# Patient Record
Sex: FEMALE | Race: WHITE | NOT HISPANIC OR LATINO | Employment: UNEMPLOYED | URBAN - NONMETROPOLITAN AREA
[De-identification: names, ages, dates, MRNs, and addresses within clinical notes are randomized per-mention and may not be internally consistent; named-entity substitution may affect disease eponyms.]

---

## 2017-01-04 ENCOUNTER — OFFICE VISIT (OUTPATIENT)
Dept: OTOLARYNGOLOGY | Facility: CLINIC | Age: 37
End: 2017-01-04

## 2017-01-04 VITALS
HEIGHT: 65 IN | HEART RATE: 68 BPM | DIASTOLIC BLOOD PRESSURE: 78 MMHG | TEMPERATURE: 98.8 F | BODY MASS INDEX: 48.82 KG/M2 | WEIGHT: 293 LBS | SYSTOLIC BLOOD PRESSURE: 130 MMHG

## 2017-01-04 DIAGNOSIS — K21.9 GASTROESOPHAGEAL REFLUX DISEASE WITHOUT ESOPHAGITIS: ICD-10-CM

## 2017-01-04 DIAGNOSIS — J31.0 CHRONIC RHINITIS: ICD-10-CM

## 2017-01-04 DIAGNOSIS — R09.89 GLOBUS SENSATION: Primary | ICD-10-CM

## 2017-01-04 DIAGNOSIS — J30.1 SEASONAL ALLERGIC RHINITIS DUE TO POLLEN: ICD-10-CM

## 2017-01-04 PROBLEM — R09.A2 GLOBUS SENSATION: Status: ACTIVE | Noted: 2017-01-04

## 2017-01-04 PROCEDURE — 1036F TOBACCO NON-USER: CPT | Performed by: OTOLARYNGOLOGY

## 2017-01-04 PROCEDURE — 99214 OFFICE O/P EST MOD 30 MIN: CPT | Performed by: OTOLARYNGOLOGY

## 2017-01-04 RX ORDER — OMEPRAZOLE 40 MG/1
40 CAPSULE, DELAYED RELEASE ORAL DAILY
Qty: 30 CAPSULE | Refills: 3 | Status: SHIPPED | OUTPATIENT
Start: 2017-01-04 | End: 2017-02-03

## 2017-01-04 RX ORDER — BUPROPION HYDROCHLORIDE 150 MG/1
TABLET, EXTENDED RELEASE ORAL DAILY
Refills: 3 | COMMUNITY
Start: 2016-12-29 | End: 2021-05-03

## 2017-01-04 RX ORDER — LEVOTHYROXINE SODIUM 0.07 MG/1
TABLET ORAL
COMMUNITY
Start: 2016-12-31

## 2017-01-04 NOTE — PROGRESS NOTES
PRIMARY CARE PROVIDER: Saad Horne MD  REFERRING PROVIDER: No ref. provider found    Chief Complaint   Patient presents with   • Sinus Problem     Cough/Snoring/Tickle in Throat   • Sinus Problem     Symptoms return after Turbinate Reduction       Subjective   History of Present Illness:  Pallavi Singh is a  36 y.o.  female who complains of nasal congestion and allergy, a globus sensation. The symptoms are not localized to a particular location. The patient has had moderate and nagging symptoms. The symptoms have been intermittant for the last several months . The symptoms are aggravated by  allergy and weather change . The symptoms are improved by no identifieable factors. She states she wakes up and feels like she has something in her throat and often has thick mucous to spit out. She states she drinks a large amount of caffeine.  She has had snoring but reports she does not tend to wake herself up at night.  She reports others that have observed her sleeping reported that there is no apnea.  She does not feel well rested during the day.  She has had previous allergy testing in the past that was positive.  She reports that initially a turbinoplasty helped her symptoms for several years but it has worsened over the last several months.  She has not using any specific allergy medications.    Review of Systems:  Review of Systems   Constitutional: Negative for chills and fever.   HENT:        See HPI   Eyes: Negative.    Respiratory: Positive for cough. Negative for shortness of breath.    Gastrointestinal: Negative for nausea and vomiting.   Allergic/Immunologic: Negative.    Neurological: Negative for dizziness and headaches.   Psychiatric/Behavioral: Negative.        Past History:  Past Medical History   Diagnosis Date   • Allergic rhinitis    • Chronic laryngitis    • Chronic rhinitis    • Chronic sinusitis    • Deviated nasal septum    • Globus sensation    • Hypothyroidism      Past Surgical History    Procedure Laterality Date   • Tonsillectomy and adenoidectomy     • Turbinoplasty  05/11/2015     Family History   Problem Relation Age of Onset   • Cancer Other    • Diabetes Other    • Hypertension Other      Social History   Substance Use Topics   • Smoking status: Never Smoker   • Smokeless tobacco: Never Used   • Alcohol use Yes      Comment: rare       Current Outpatient Prescriptions:   •  buPROPion SR (WELLBUTRIN SR) 150 MG 12 hr tablet, , Disp: , Rfl: 3  •  levothyroxine (SYNTHROID, LEVOTHROID) 75 MCG tablet, , Disp: , Rfl:   •  omeprazole (priLOSEC) 40 MG capsule, Take 1 capsule by mouth Daily for 30 days. Take 40 mg by mouth 30 minute-1 hour before the last large meal you would eat before going to bed, Disp: 30 capsule, Rfl: 3  Allergies:  Bactrim [sulfamethoxazole-trimethoprim]    Objective     Vital Signs:  Temp:  [98.8 °F (37.1 °C)] 98.8 °F (37.1 °C)  Heart Rate:  [68] 68  BP: (130)/(78) 130/78    Physical Exam:  CONSTITUTIONAL: well nourished, well-developed, alert, oriented, in no acute distress   COMMUNICATION AND VOICE: able to communicate normally, normal voice quality  HEAD: normocephalic, no lesions, atraumatic, no tenderness, no masses   FACE: appearance normal, no lesions, no tenderness, no deformities, facial motion symmetric  SALIVARY GLANDS: parotid glands with no tenderness, no swelling, no masses, submandibular glands with normal size, nontender  EYES: ocular motility normal, eyelids normal, orbits normal, no proptosis, conjunctiva normal , pupils equal, round   EARS:  Hearing: response to conversational voice normal bilaterally   External Ears: auricles without lesions  Otoscopic: tympanic membrane appearance normal, no lesions, no perforation, normal mobility, no fluid  NOSE:  External Nose: structure normal, no tenderness on palpation, no nasal discharge, no lesions, no evidence of trauma, nostrils patent   Intranasal Exam: nasal mucosa edema and erythema, vestibule within normal  limits, inferior turbinate mild bilateral hypertrophy, nasal septum relatively midline   Nasopharynx: nasopharyngeal mucosa within normal limits  ORAL:  Lips: upper and lower lips without lesion   Teeth: dentition within normal limits for age   Gums: gingivae healthy   Oral Mucosa: oral mucosa normal, no mucosal lesions   Floor of Mouth: Warthin’s duct patent, mucosa normal  Tongue: lingual mucosa normal without lesions, normal tongue mobility   Palate: soft and hard palates with normal mucosa and structure  Oropharynx: oropharyngeal mucosa dry, s/p tonsillectomy  HYPOPHARYNX: hypopharyngeal mucosa normal  LARYNX: epiglottis is omega shaped and prevents visualization of the glottis.  There is no mass or lesion seen there is generalized dryness   NECK: neck appearance normal, no masses or tenderness  THYROID: no overt thyromegaly, no tenderness, nodules or mass present on palpation, position midline   LYMPH NODES: no lymphadenopathy  CHEST/RESPIRATORY: respiratory effort normal, normal breath sounds   CARDIOVASCULAR: rate and rhythm normal, extremities without cyanosis or edema    NEUROLOGIC/PSYCHIATRIC: oriented to time, place and person, mood normal, affect appropriate, CN II-XII intact grossly    Results Review:   None    Assessment   1. Globus sensation    2. Seasonal allergic rhinitis due to pollen    3. Gastroesophageal reflux disease without esophagitis    4. Chronic rhinitis        Plan   Continue current management plan. Decrease caffeine intake and increase water intake. Patient states she is not interested in nasal sprays because they irritate her nose. We discussed use of an OTC allergy medication such as Zyrtec, Claritin or Allegra. She can also use Mucinex to help thin secretions.      -------MEDICATIONS:-------  New Medications Ordered This Visit   Medications   • omeprazole (priLOSEC) 40 MG capsule     Sig: Take 1 capsule by mouth Daily for 30 days. Take 40 mg by mouth 30 minute-1 hour before the last  large meal you would eat before going to bed     Dispense:  30 capsule     Refill:  3      Sleep with the head of bed on an incline. No large meals 1-2 hrs prior to sleep. The patient was instructed to use PPI's 30 minutes prior to the last meal of the day. The patient was advised to avoid fatty meals and caffine or alcohol prior to sleep. Increase water/ non caffeinated drink intake to at least 6-8 glasses of  a day. Decrease caffeine intake. Plain Mucinex over the counter as needed to thin out secretions.    --------TESTING:--------  Intradermal Allergy Testing - hold antihistamines for 1 week prior to the testing      -----FOLLOW UP-----  Return for Allergy testing results.        Yair Bagley MD  01/04/17  3:28 PM

## 2017-01-04 NOTE — MR AVS SNAPSHOT
Pallavi Singh   1/4/2017 1:45 PM   Office Visit    Dept Phone:  [phone]   Encounter #:  [id number]    Provider:  Yair Bagley MD   Department:  [facility]                Your Full Care Plan              Today's Medication Changes          These changes are accurate as of: 1/4/17  3:19 PM.  If you have any questions, ask your nurse or doctor.               New Medication(s)Ordered:     omeprazole 40 MG capsule   Commonly known as:  priLOSEC   Take 1 capsule by mouth Daily for 30 days. Take 40 mg by mouth 30 minute-1 hour before the last large meal you would eat before going to bed   Started by:  Yair Bagley MD            Where to Get Your Medications      These medications were sent to [facility] [address], [place] - [address] AT [facility] [address] - [phone]  - [phone] FX  [address], [place] [address]    Hours:  24-hours Phone:  [phone]     omeprazole 40 MG capsule                  Your Updated Medication List          This list is accurate as of: 1/4/17  3:19 PM.  Always use your most recent med list.                buPROPion  MG 12 hr tablet   Commonly known as:  WELLBUTRIN SR       levothyroxine 75 MCG tablet   Commonly known as:  SYNTHROID, LEVOTHROID       omeprazole 40 MG capsule   Commonly known as:  priLOSEC   Take 1 capsule by mouth Daily for 30 days. Take 40 mg by mouth 30 minute-1 hour before the last large meal you would eat before going to bed               We Performed the Following     Intradermal Allergy Testing       You Were Diagnosed With        Codes Comments    Globus sensation    -  Primary ICD-10-CM: F45.8  ICD-9-CM: 306.4     Seasonal allergic rhinitis due to pollen     ICD-10-CM: J30.1  ICD-9-CM: 477.0     Gastroesophageal reflux disease without esophagitis     ICD-10-CM: K21.9  ICD-9-CM: 530.81     Chronic rhinitis     ICD-10-CM: J31.0  ICD-9-CM: 472.0        "  Instructions     None    Patient Instructions History      Upcoming Appointments     Visit Type Date Time Department    [facility]  1:45 PM [facility]    TEST 2017  3:00 PM [facility] [place]      MyChart Signup     Our records indicate that you have declined [facility] signup. If you would like to sign up for MyChart, please email [email or web address] or call [phone] to obtain an activation code.             Other Info from Your Visit           Your Appointments     2017  3:00 PM CST   TEST with ALLERGY TECH ENT [facility] (--)    [address]   [address]  [facility] [address] [phone]              Allergies     Bactrim [Sulfamethoxazole-trimethoprim]  Rash      Reason for Visit     Sinus Problem Cough/Snoring/Tickle in Throat    Sinus Problem Symptoms return after Turbinate Reduction      Vital Signs     Blood Pressure Pulse Temperature Height Weight Body Mass Index    130/78 68 98.8 °F (37.1 °C) 65\" (165.1 cm) 306 lb (139 kg) 50.92 kg/m2    Smoking Status                   Never Smoker           Problems and Diagnoses Noted     Chronic inflammation of the nose    Acid reflux disease    Sensation of lump in throat    Seasonal allergic rhinitis due to pollen        "

## 2017-01-10 ENCOUNTER — PROCEDURE VISIT (OUTPATIENT)
Dept: OTOLARYNGOLOGY | Facility: CLINIC | Age: 37
End: 2017-01-10

## 2017-01-10 DIAGNOSIS — J30.89 OTHER ALLERGIC RHINITIS: Primary | ICD-10-CM

## 2017-01-10 PROCEDURE — 95004 PERQ TESTS W/ALRGNC XTRCS: CPT | Performed by: NURSE PRACTITIONER

## 2017-01-10 PROCEDURE — 95024 IQ TESTS W/ALLERGENIC XTRCS: CPT | Performed by: NURSE PRACTITIONER

## 2017-01-10 NOTE — PROGRESS NOTES
Allergy History Questionnaire  Pallavi Singh 1980 1/10/2017  Occupation: Director of [employer]    Symptoms  (Check which applies)  Severity? Frequency? When are they worse?   [] Mild [x] Constant [] Spring   [] Moderate [] Erratic [] Summer   [] Severe [] Occasional [] Fall   [x] Variable [] Seasonal [] Winter     [] Year Round       Do they interfere with your life? Do they interfere with your sleep?   [] Note at all [x] Yes   [] A little [] No   [] Moderately [] If yes, please explain: _____coughing______________________________   [x] Prevents normal activity        Please check the symptoms that apply to your allergy symptoms.  Nose Eyes Ears   [x] Bleeding [] Infections [x] Buzzing   [x] Crusting [x] Itching [x] Dizziness   [x] Dryness [x] Redness [] Drainage   [] Itching [x] Swollen Lids [x] Fullness   [x] Nasal Congestion [] Watery [x] Hearing Loss   [x] Nasal Drainage [] None [x] Itching   [] Nasal Polyps  [] Pain   [] Septal Deviation  [x] Pressure   [x] Sneezing     [] None         Throat Mouth Chest   [] Burning [] Burning [x] Asthma as a child   [x] Dryness [x] Dryness [x] Asthma as an adult   [x] Hoarseness [] Itching [x] Bronchitis   [x] Laryngitis [x] Mouth Breathing [x] Cough   [x] Snoring [] None [x] Pneumonia   [x] Sore Throats  [] Wheezing   [x] Tonsillectomy  [] None   [] Vocal Cord Polyps     [] None           Skin   [x] Dryness   [] Eczema   [x] Hives   [x] Itching   [] Rash   [] Swelling   [] None     Other Irritants: none    Environment    Inside Home? Smoking Habits?   [x] Carpeting [] Cigarettes   [] Hardwood [] Cigars   [] Fireplace (Gas or Wood Burning) [] Pipe   [x] Laminate Floor [] Years Smoked   [] Plants [] Stopped Smoking    [] Tile [x] Exposed to Secondhand Smoke     Animals in the home? Near your home?   [] Bird(s) [] Barn   [x] Cat(s) x 2 [] Factory   [] Dog(s) [x] Fields   [] Fish [x] Trees   [] Other [x] Weeds    [x] Water (creek, lake, pond, river)     Heating your  home? Cooling your home?   [x] Electric [x] Central Air Unit   [] Natural Gas [] Fan(s)   [] Oil [] Window Unit   [] Propane    [] Wood    [] Luciano/Thermal      Any known allergic reactions to any of the following?   [x] Bees   [x] Mosquitoes   [x] Wasps                 Have you ever been allergy tested in the past?  Yes as a young child.    Have you ever had to seek medical treatment in an Emergency Room due to an allergic reaction?  No

## 2017-01-10 NOTE — MR AVS SNAPSHOT
Pallavi Singh   1/10/2017 3:00 PM   Procedure visit    Dept Phone:  [phone]   Encounter #:  [id number]    Provider:  ALLERGY TECH ENT [place]   Department:  [facility]                Your Full Care Plan              Your Updated Medication List          This list is accurate as of: 1/10/17  4:40 PM.  Always use your most recent med list.                buPROPion  MG 12 hr tablet   Commonly known as:  WELLBUTRIN SR       levothyroxine 75 MCG tablet   Commonly known as:  SYNTHROID, LEVOTHROID       omeprazole 40 MG capsule   Commonly known as:  priLOSEC   Take 1 capsule by mouth Daily for 30 days. Take 40 mg by mouth 30 minute-1 hour before the last large meal you would eat before going to bed               We Performed the Following     Intradermal Allergy Testing     Prick Testing (multi-Test)       You Were Diagnosed With        Codes Comments    Other allergic rhinitis    -  Primary ICD-10-CM: J30.89  ICD-9-CM: 477.8       Instructions     None    Patient Instructions History      Upcoming Appointments     Visit Type Date Time Department    TEST 1/10/2017  3:00 PM [facility]    FOLLOW UP 2017 12:45 PM [facility]      MyChart Signup     Our records indicate that you have declined [facility] signup. If you would like to sign up for [employer], please email [email or web address] or call [phone] to obtain an activation code.             Other Info from Your Visit           Your Appointments     2017 12:45 PM CST   Follow Up with Yair Bagley MD   [facility] (--)    [address] [place] [address] [address] [facility] [address] [place] [id number] [phone]           Arrive 15 minutes prior to appointment.              Allergies     Bactrim [Sulfamethoxazole-trimethoprim]  Rash      Reason for Visit     Allergy Testing           Vital Signs     Smoking Status                   Never Smoker           Problems and  Diagnoses Noted     Other allergic rhinitis    -  Primary

## 2017-02-06 ENCOUNTER — OFFICE VISIT (OUTPATIENT)
Dept: OTOLARYNGOLOGY | Facility: CLINIC | Age: 37
End: 2017-02-06

## 2017-02-06 VITALS
TEMPERATURE: 98 F | SYSTOLIC BLOOD PRESSURE: 121 MMHG | DIASTOLIC BLOOD PRESSURE: 88 MMHG | HEIGHT: 65 IN | BODY MASS INDEX: 48.82 KG/M2 | HEART RATE: 93 BPM | WEIGHT: 293 LBS

## 2017-02-06 DIAGNOSIS — J30.1 SEASONAL ALLERGIC RHINITIS DUE TO POLLEN: Primary | ICD-10-CM

## 2017-02-06 DIAGNOSIS — J31.0 CHRONIC RHINITIS: ICD-10-CM

## 2017-02-06 PROCEDURE — 99213 OFFICE O/P EST LOW 20 MIN: CPT | Performed by: OTOLARYNGOLOGY

## 2017-02-06 RX ORDER — CETIRIZINE HYDROCHLORIDE 10 MG/1
10 TABLET ORAL DAILY
Qty: 30 TABLET | Refills: 3 | Status: SHIPPED | OUTPATIENT
Start: 2017-02-06 | End: 2017-03-08

## 2017-02-06 RX ORDER — MONTELUKAST SODIUM 10 MG/1
10 TABLET ORAL DAILY
Qty: 30 TABLET | Refills: 3 | Status: SHIPPED | OUTPATIENT
Start: 2017-02-06 | End: 2017-03-08

## 2017-02-06 RX ORDER — FLUOXETINE 20 MG/1
TABLET, FILM COATED ORAL
COMMUNITY
Start: 2017-02-03 | End: 2021-05-03

## 2017-02-06 RX ORDER — FLUTICASONE PROPIONATE 44 UG/1
2 AEROSOL, METERED RESPIRATORY (INHALATION) DAILY
Qty: 10.6 G | Refills: 11 | Status: SHIPPED | OUTPATIENT
Start: 2017-02-06 | End: 2021-05-03

## 2017-02-06 NOTE — PROGRESS NOTES
PRIMARY CARE PROVIDER: Saad Horne MD  REFERRING PROVIDER: No ref. provider found    Chief Complaint   Patient presents with   • Follow-up     Allergy Testing Results       Subjective   History of Present Illness:  Pallavi Singh is a  36 y.o.  female who is here for follow up allergy testing. She has had continued problems with nasal drainage, nasal congestion and allergy. The symptoms are not localized to a particular location. She has had moderate symptoms. The symptoms have been relatively constant for the last several years . The symptoms are aggravated by  allergy and weather change . The symptoms are improved by no identifieable factors.  She states she is currently not on an antihistamine or nasal spray; she has not had success with nasal sprays in the past but is willing to try an oral antihistamine.     Review of Systems:  Review of Systems   Constitutional: Negative for chills and fever.   HENT:        See HPI   Eyes: Negative.    Respiratory: Negative for cough and shortness of breath.    Cardiovascular: Negative.    Gastrointestinal: Negative for nausea and vomiting.   Neurological: Negative for dizziness and headaches.   Hematological: Does not bruise/bleed easily.   Psychiatric/Behavioral: Negative.        Past History:  Past Medical History   Diagnosis Date   • Allergic rhinitis    • Chronic laryngitis    • Chronic rhinitis    • Chronic sinusitis    • Deviated nasal septum    • Globus sensation    • Hypothyroidism      Past Surgical History   Procedure Laterality Date   • Tonsillectomy and adenoidectomy     • Turbinoplasty  05/11/2015     Family History   Problem Relation Age of Onset   • Cancer Other    • Diabetes Other    • Hypertension Other      Social History   Substance Use Topics   • Smoking status: Never Smoker   • Smokeless tobacco: Never Used   • Alcohol use Yes      Comment: rare       Current Outpatient Prescriptions:   •  buPROPion SR (WELLBUTRIN SR) 150 MG 12 hr tablet, Daily.,  Disp: , Rfl: 3  •  FLUoxetine (PROzac) 20 MG tablet, , Disp: , Rfl:   •  levothyroxine (SYNTHROID, LEVOTHROID) 75 MCG tablet, , Disp: , Rfl:   •  cetirizine (zyrTEC) 10 MG tablet, Take 1 tablet by mouth Daily for 30 days. 1 by mouth every day as needed for allergy symptoms, Disp: 30 tablet, Rfl: 3  •  fluticasone (FLOVENT HFA) 44 MCG/ACT inhaler, Inhale 2 puffs Daily., Disp: 10.6 g, Rfl: 11  •  montelukast (SINGULAIR) 10 MG tablet, Take 1 tablet by mouth Daily for 30 days., Disp: 30 tablet, Rfl: 3  Allergies:  Bactrim [sulfamethoxazole-trimethoprim]    Objective     Vital Signs:  Temp:  [98 °F (36.7 °C)] 98 °F (36.7 °C)  Heart Rate:  [93] 93  BP: (121)/(88) 121/88    Physical Exam:  CONSTITUTIONAL: well nourished, well-developed, alert, oriented, in no acute distress   COMMUNICATION AND VOICE: able to communicate normally for age, normal voice quality  HEAD: normocephalic, no lesions, atraumatic, no tenderness, no masses   FACE: appearance normal, no lesions, no tenderness, no deformities, facial motion symmetric  EYES: ocular motility normal, eyelids normal, orbits normal, no proptosis, conjunctiva normal , pupils equal, round   EARS:  Hearing: response to conversational voice normal bilaterally   External Ears: auricles without lesions  NOSE:  External Nose: structure normal, no tenderness on palpation, no nasal discharge, no lesions, no evidence of trauma, nostrils patent   Intranasal exam: nasal mucosa with mucosal congestion and erythema, nasal septum relatively midline   ORAL:  Lips: upper and lower lips without lesion   NECK: neck appearance normal  CHEST/RESPIRATORY: respiratory effort normal, normal chest excursion  CARDIOVASCULAR: extremities without cyanosis or edema   NEUROLOGIC/PSYCHIATRIC: oriented appropriately, mood normal, affect appropriate, CN II-XII intact grossly    Results Review:   Allergy testing results reviewed.     Assessment   1. Seasonal allergic rhinitis due to pollen    2. Chronic  rhinitis        Plan   Continue current management plan.      -------MEDICATIONS:-------  New Medications Ordered This Visit   Medications   • cetirizine (zyrTEC) 10 MG tablet     Sig: Take 1 tablet by mouth Daily for 30 days. 1 by mouth every day as needed for allergy symptoms     Dispense:  30 tablet     Refill:  3   • fluticasone (FLOVENT HFA) 44 MCG/ACT inhaler     Sig: Inhale 2 puffs Daily.     Dispense:  10.6 g     Refill:  11   • montelukast (SINGULAIR) 10 MG tablet     Sig: Take 1 tablet by mouth Daily for 30 days.     Dispense:  30 tablet     Refill:  3     Immunotherapy risks and benefits were discussed with the patient/family at length including but not limited to the risk of reaction including anaphylaxis requiring hospitalization and/or death. The possibility of failure of therapy was also discussed as well as the necessity of having an epi pen with them to receive therapy every time.   Sublingual immunotherapy was discussed as an alternative. Benefits of a better safety profile, allowing for safe home use as well as decreased patient costs (gas and time savings as well as no copays etc) were discussed. Billing was discussed as well as most insurance do not cover sublingual therapy requiring out of pocket billling. Though generally safer than injections, the necessity of having an epi pen with them when they placed the drops was stressed. They were also instructed to never be alone when administering the drops. The fact that aqueous sublingual immunotherapy was not FDA approved was also discussed.   After considering the options of immunotherapy, she decided to try medication and consider immunotherapy in the future  GENERAL ALLERGY AVOIDANCE: Regular vacuum cleaning is  recommended  to prevent accumulation of allergens. Use adequate filtration, including double thickness bags or HEPA filters on the  outlet. Use air-conditioning where possible. Change central air filters with an allergy rated  filter on a regular basis. Install car pollen filters where possible.   DUST MITE AVOIDANCE: Use matress and pillow covers to prevent dust mite contamination. Wash bedsheets in hot water at least twice a week to prevent dust mites. Try a dehumidifier to discourage dust mite growth. Consider removing carpets and replaceing with hard wook geovany. Remove things like drapery and upholstery if possible to prevent dust collection. Dust with a mask and a damp duster.   CAT AVOIDANCE: Consider cat removal or preventing the cat in the home (outdoor cat). Try to reduce reservoirs for cat allergens, such as carpets, sofas, drapes and blankets. Washing animals regularly removes large quantities of allergen, but needs to be repeated regularly, perhaps as often as twice weekly. Washing may be only possible with a few cats.   MOLD AVOIDANCE: Avoiding Outdoor Molds: 1) Decrease decaying vegetationand mulch near house 2) Ventilate crawl space 3) Sump pump excess water 4) Drain low lying areas around house 5) Avoid lawnmowing or wear mask during and after mowing 6) Avoid storage areas of grains, berger and decaying vegetation -Avoiding Indoor Molds: 1) Keep humidity below 50% with a dehumidifier 2) Repair leaks- use fungicide 3) Clean up damp areas 4) Use mold retardant in paint, wallpaper glue, shower curtains, grout, clean regularly with a chlorine bleach solution 5) Examine houseplants for molds, especially in the soil 6) Examine basements, crawl spaces for water and damp areas. 7) Avoid carpet in the basement areas.       -----FOLLOW UP-----  Return in about 6 months (around 8/6/2017) for Allergy f/u.        Yair Bagley MD  02/06/17  1:36 PM

## 2017-02-06 NOTE — PATIENT INSTRUCTIONS
GENERAL ALLERGY AVOIDANCE: Regular vacuum cleaning is  recommended  to prevent accumulation of allergens. Use adequate filtration, including double thickness bags or HEPA filters on the  outlet. Use air-conditioning where possible. Change central air filters with an allergy rated filter on a regular basis. Install car pollen filters where possible.   DUST MITE AVOIDANCE: Use matress and pillow covers to prevent dust mite contamination. Wash bedsheets in hot water at least twice a week to prevent dust mites. Try a dehumidifier to discourage dust mite growth. Consider removing carpets and replaceing with hard wook geovany. Remove things like drapery and upholstery if possible to prevent dust collection. Dust with a mask and a damp duster.   CAT AVOIDANCE: Consider cat removal or preventing the cat in the home (outdoor cat). Try to reduce reservoirs for cat allergens, such as carpets, sofas, drapes and blankets. Washing animals regularly removes large quantities of allergen, but needs to be repeated regularly, perhaps as often as twice weekly. Washing may be only possible with a few cats.   MOLD AVOIDANCE: Avoiding Outdoor Molds: 1) Decrease decaying vegetationand mulch near house 2) Ventilate crawl space 3) Sump pump excess water 4) Drain low lying areas around house 5) Avoid lawnmowing or wear mask during and after mowing 6) Avoid storage areas of grains, berger and decaying vegetation -Avoiding Indoor Molds: 1) Keep humidity below 50% with a dehumidifier 2) Repair leaks- use fungicide 3) Clean up damp areas 4) Use mold retardant in paint, wallpaper glue, shower curtains, grout, clean regularly with a chlorine bleach solution 5) Examine houseplants for molds, especially in the soil 6) Examine basements, crawl spaces for water and damp areas. 7) Avoid carpet in the basement areas.

## 2018-02-13 ENCOUNTER — OFFICE VISIT (OUTPATIENT)
Dept: OBGYN | Age: 38
End: 2018-02-13
Payer: COMMERCIAL

## 2018-02-13 VITALS
WEIGHT: 293 LBS | BODY MASS INDEX: 48.82 KG/M2 | HEIGHT: 65 IN | SYSTOLIC BLOOD PRESSURE: 168 MMHG | DIASTOLIC BLOOD PRESSURE: 105 MMHG

## 2018-02-13 DIAGNOSIS — N92.6 IRREGULAR BLEEDING: ICD-10-CM

## 2018-02-13 DIAGNOSIS — R63.5 WEIGHT GAIN: ICD-10-CM

## 2018-02-13 DIAGNOSIS — Z01.419 WELL WOMAN EXAM WITH ROUTINE GYNECOLOGICAL EXAM: Primary | ICD-10-CM

## 2018-02-13 DIAGNOSIS — Z11.3 SCREEN FOR STD (SEXUALLY TRANSMITTED DISEASE): ICD-10-CM

## 2018-02-13 DIAGNOSIS — Z12.4 SCREENING FOR CERVICAL CANCER: ICD-10-CM

## 2018-02-13 PROCEDURE — 99395 PREV VISIT EST AGE 18-39: CPT | Performed by: NURSE PRACTITIONER

## 2018-02-13 RX ORDER — ACETAMINOPHEN AND CODEINE PHOSPHATE 120; 12 MG/5ML; MG/5ML
1 SOLUTION ORAL DAILY
Qty: 28 TABLET | Refills: 11 | Status: SHIPPED | OUTPATIENT
Start: 2018-02-13 | End: 2019-02-16 | Stop reason: SDUPTHER

## 2018-02-13 RX ORDER — LEVOTHYROXINE SODIUM 0.07 MG/1
TABLET ORAL
COMMUNITY
Start: 2016-12-31

## 2018-02-13 ASSESSMENT — ENCOUNTER SYMPTOMS
EYES NEGATIVE: 1
GASTROINTESTINAL NEGATIVE: 1
RESPIRATORY NEGATIVE: 1

## 2018-02-13 NOTE — PROGRESS NOTES
pectoral and no lateral adenopathy present. Left axillary: No pectoral and no lateral adenopathy present. Right: No inguinal, no supraclavicular and no epitrochlear adenopathy present. Left: No inguinal, no supraclavicular and no epitrochlear adenopathy present. Neurological: She is alert and oriented to person, place, and time. She has normal reflexes. Skin: Skin is warm and dry. No rash noted. Psychiatric: She has a normal mood and affect. Nursing note and vitals reviewed. 1. Well woman exam with routine gynecological exam     2. Screening for cervical cancer  PAP SMEAR   3. Screen for STD (sexually transmitted disease)  CANCELED: HIV Screen    CANCELED: Hepatitis Panel, Acute    CANCELED: RPR Reflex to Titer and TPPA   4. Irregular bleeding     5. Weight gain         MEDICATIONS:  Orders Placed This Encounter   Medications    norethindrone (ORTHO MICRONOR) 0.35 MG tablet     Sig: Take 1 tablet by mouth daily     Dispense:  28 tablet     Refill:  11       ORDERS:  Orders Placed This Encounter   Procedures    PAP SMEAR       PLAN:  Pap collected  Discussed birth control options, start Sunday after next period. Patient advised to take same time daily, may have breakthrough bleeding for the first 1-3 months. Advised to use back up contraception for the first month and with use of antibiotics. Birth control is not effective against STD's. Risk vs. Benefits discussed. Patient voiced understanding. Patient Instructions   Patient Education        Well Visit, Ages 25 to 48: Care Instructions  Your Care Instructions    Physical exams can help you stay healthy. Your doctor has checked your overall health and may have suggested ways to take good care of yourself. He or she also may have recommended tests. At home, you can help prevent illness with healthy eating, regular exercise, and other steps. Follow-up care is a key part of your treatment and safety.  Be sure to make and go to all appointments, and call your doctor if you are having problems. It's also a good idea to know your test results and keep a list of the medicines you take. How can you care for yourself at home? · Reach and stay at a healthy weight. This will lower your risk for many problems, such as obesity, diabetes, heart disease, and high blood pressure. · Get at least 30 minutes of physical activity on most days of the week. Walking is a good choice. You also may want to do other activities, such as running, swimming, cycling, or playing tennis or team sports. Discuss any changes in your exercise program with your doctor. · Do not smoke or allow others to smoke around you. If you need help quitting, talk to your doctor about stop-smoking programs and medicines. These can increase your chances of quitting for good. · Talk to your doctor about whether you have any risk factors for sexually transmitted infections (STIs). Having one sex partner (who does not have STIs and does not have sex with anyone else) is a good way to avoid these infections. · Use birth control if you do not want to have children at this time. Talk with your doctor about the choices available and what might be best for you. · Protect your skin from too much sun. When you're outdoors from 10 a.m. to 4 p.m., stay in the shade or cover up with clothing and a hat with a wide brim. Wear sunglasses that block UV rays. Even when it's cloudy, put broad-spectrum sunscreen (SPF 30 or higher) on any exposed skin. · See a dentist one or two times a year for checkups and to have your teeth cleaned. · Wear a seat belt in the car. · Drink alcohol in moderation, if at all. That means no more than 2 drinks a day for men and 1 drink a day for women. Follow your doctor's advice about when to have certain tests. These tests can spot problems early. For everyone  · Cholesterol. Have the fat (cholesterol) in your blood tested after age 21.  Your doctor will tell you how should have this test. Some experts suggest it if you are older than 39 and are -American or have a father or brother who got prostate cancer when he was younger than 72. When should you call for help? Watch closely for changes in your health, and be sure to contact your doctor if you have any problems or symptoms that concern you. Where can you learn more? Go to https://Liquor.compepiceweb.[employer]. org and sign in to your Ce[employer] account. Enter P072 in the [employer] (P) box to learn more about \"Well Visit, Ages 25 to 48: Care Instructions. \"     If you do not have an account, please click on the \"Sign Up Now\" link. Current as of: May 12, 2017  Content Version: 11.5  © 1729-7048 Healthwise, Incorporated. Care instructions adapted under license by [facility] ([facility]). If you have questions about a medical condition or this instruction, always ask your healthcare professional. [address] any warranty or liability for your use of this information.

## 2018-02-13 NOTE — PATIENT INSTRUCTIONS
Patient Education        Well Visit, Ages 25 to 48: Care Instructions  Your Care Instructions    Physical exams can help you stay healthy. Your doctor has checked your overall health and may have suggested ways to take good care of yourself. He or she also may have recommended tests. At home, you can help prevent illness with healthy eating, regular exercise, and other steps. Follow-up care is a key part of your treatment and safety. Be sure to make and go to all appointments, and call your doctor if you are having problems. It's also a good idea to know your test results and keep a list of the medicines you take. How can you care for yourself at home? · Reach and stay at a healthy weight. This will lower your risk for many problems, such as obesity, diabetes, heart disease, and high blood pressure. · Get at least 30 minutes of physical activity on most days of the week. Walking is a good choice. You also may want to do other activities, such as running, swimming, cycling, or playing tennis or team sports. Discuss any changes in your exercise program with your doctor. · Do not smoke or allow others to smoke around you. If you need help quitting, talk to your doctor about stop-smoking programs and medicines. These can increase your chances of quitting for good. · Talk to your doctor about whether you have any risk factors for sexually transmitted infections (STIs). Having one sex partner (who does not have STIs and does not have sex with anyone else) is a good way to avoid these infections. · Use birth control if you do not want to have children at this time. Talk with your doctor about the choices available and what might be best for you. · Protect your skin from too much sun. When you're outdoors from 10 a.m. to 4 p.m., stay in the shade or cover up with clothing and a hat with a wide brim. Wear sunglasses that block UV rays.  Even when it's cloudy, put broad-spectrum sunscreen (SPF 30 or higher) on any condoms. For men  · Tests for sexually transmitted infections (STIs). Ask whether you should have tests for STIs. You may be at risk if you have sex with more than one person, especially if you do not wear a condom. · Testicular cancer exam. Ask your doctor whether you should check your testicles regularly. · Prostate exam. Talk to your doctor about whether you should have a blood test (called a PSA test) for prostate cancer. Experts differ on whether and when men should have this test. Some experts suggest it if you are older than 39 and are -American or have a father or brother who got prostate cancer when he was younger than 72. When should you call for help? Watch closely for changes in your health, and be sure to contact your doctor if you have any problems or symptoms that concern you. Where can you learn more? Go to https://Lateral SVpekelleneb.healthI Gotchu. org and sign in to your LogR[employer] account. Enter P072 in the [employer] box to learn more about \"Well Visit, Ages 25 to 48: Care Instructions. \"     If you do not have an account, please click on the \"Sign Up Now\" link. Current as of: May 12, 2017  Content Version: 11.5  © 5602-8222 Healthwise, Incorporated. Care instructions adapted under license by [facility] ([facility]). If you have questions about a medical condition or this instruction, always ask your healthcare professional. [address]  any warranty or liability for your use of this information.

## 2019-02-18 RX ORDER — ACETAMINOPHEN AND CODEINE PHOSPHATE 120; 12 MG/5ML; MG/5ML
1 SOLUTION ORAL DAILY
Qty: 28 TABLET | Refills: 0 | Status: SHIPPED | OUTPATIENT
Start: 2019-02-18 | End: 2019-03-20 | Stop reason: SDUPTHER

## 2019-03-25 ENCOUNTER — OFFICE VISIT (OUTPATIENT)
Dept: NEUROLOGY | Age: 39
End: 2019-03-25
Payer: COMMERCIAL

## 2019-03-25 VITALS
SYSTOLIC BLOOD PRESSURE: 135 MMHG | OXYGEN SATURATION: 99 % | DIASTOLIC BLOOD PRESSURE: 94 MMHG | BODY MASS INDEX: 48.15 KG/M2 | HEART RATE: 88 BPM | HEIGHT: 65 IN | WEIGHT: 289 LBS

## 2019-03-25 DIAGNOSIS — G47.00 INSOMNIA, UNSPECIFIED TYPE: ICD-10-CM

## 2019-03-25 DIAGNOSIS — G47.419 PRIMARY NARCOLEPSY WITHOUT CATAPLEXY: ICD-10-CM

## 2019-03-25 DIAGNOSIS — G47.33 OBSTRUCTIVE SLEEP APNEA: Primary | ICD-10-CM

## 2019-03-25 DIAGNOSIS — R06.83 SNORING: ICD-10-CM

## 2019-03-25 DIAGNOSIS — R40.0 SOMNOLENCE, DAYTIME: ICD-10-CM

## 2019-03-25 PROCEDURE — 99203 OFFICE O/P NEW LOW 30 MIN: CPT | Performed by: PHYSICIAN ASSISTANT

## 2020-01-15 ENCOUNTER — TRANSCRIBE ORDERS (OUTPATIENT)
Dept: ADMINISTRATIVE | Facility: HOSPITAL | Age: 40
End: 2020-01-15

## 2020-01-15 DIAGNOSIS — R13.10 DYSPHAGIA, UNSPECIFIED TYPE: Primary | ICD-10-CM

## 2020-01-15 DIAGNOSIS — K21.9 GERD WITHOUT ESOPHAGITIS: ICD-10-CM

## 2021-03-30 ENCOUNTER — TELEPHONE (OUTPATIENT)
Dept: BARIATRICS/WEIGHT MGMT | Facility: CLINIC | Age: 41
End: 2021-03-30

## 2021-04-06 ENCOUNTER — OFFICE VISIT (OUTPATIENT)
Dept: BARIATRICS/WEIGHT MGMT | Facility: CLINIC | Age: 41
End: 2021-04-06

## 2021-04-06 ENCOUNTER — OFFICE VISIT (OUTPATIENT)
Dept: BARIATRICS/WEIGHT MGMT | Facility: HOSPITAL | Age: 41
End: 2021-04-06

## 2021-04-06 VITALS
WEIGHT: 293 LBS | TEMPERATURE: 97.5 F | SYSTOLIC BLOOD PRESSURE: 149 MMHG | DIASTOLIC BLOOD PRESSURE: 96 MMHG | HEIGHT: 65 IN | BODY MASS INDEX: 48.82 KG/M2 | HEART RATE: 95 BPM | OXYGEN SATURATION: 97 %

## 2021-04-06 DIAGNOSIS — E66.01 CLASS 3 SEVERE OBESITY DUE TO EXCESS CALORIES WITH SERIOUS COMORBIDITY AND BODY MASS INDEX (BMI) OF 50.0 TO 59.9 IN ADULT (HCC): Primary | ICD-10-CM

## 2021-04-06 DIAGNOSIS — K21.9 GASTROESOPHAGEAL REFLUX DISEASE WITHOUT ESOPHAGITIS: ICD-10-CM

## 2021-04-06 PROBLEM — E66.813 CLASS 3 SEVERE OBESITY DUE TO EXCESS CALORIES WITH SERIOUS COMORBIDITY AND BODY MASS INDEX (BMI) OF 50.0 TO 59.9 IN ADULT: Status: ACTIVE | Noted: 2021-04-06

## 2021-04-06 PROCEDURE — 99204 OFFICE O/P NEW MOD 45 MIN: CPT | Performed by: NURSE PRACTITIONER

## 2021-04-06 NOTE — PROGRESS NOTES
Patient Care Team:  Saad Horne MD as PCP - General (Family Medicine)  Kevyn, Yair Bassett MD as Consulting Physician (Otolaryngology)      Subjective     Patient is a 40 y.o. female presents with morbid obesity and her Body mass index is 52.39 kg/m².     She is here for discussion of surgical weight loss options.  She stated she has been with the disease of obesity for year(s).  She stated she suffers from chronic fatigue, hypothyroid, fibromyalgia, and morbid obesity due to her weight gain.  She stated that weight loss helps alleviate these symptoms.   She stated that she has tried multiple other diet regimens to help with weight loss.  She stated that she has attempted these conservative methods for weight loss without maintaining long term success.  Today she would like to discuss surgical weight loss options such as the Laparoscopic Sleeve Gastrectomy or the Laparoscopic R - Y Gastric Bypass.     Patient states that she has been overweight for greater than 30 years and in the past has lost 70 pounds and maintain it for about 3 years.  She states that this was many years ago.      Review of Systems   Constitutional: Positive for fatigue.   Respiratory: Negative.    Cardiovascular: Negative.    Gastrointestinal: Negative.    Endocrine: Negative.    Musculoskeletal: Positive for arthralgias.   Psychiatric/Behavioral: Negative.         History  Past Medical History:   Diagnosis Date   • Allergic rhinitis    • Chronic laryngitis    • Chronic rhinitis    • Chronic sinusitis    • Deviated nasal septum    • Globus sensation    • Hypothyroidism       Past Surgical History:   Procedure Laterality Date   • TONSILLECTOMY AND ADENOIDECTOMY     • TURBINOPLASTY  05/11/2015      Social History     Socioeconomic History   • Marital status: Single     Spouse name: Not on file   • Number of children: Not on file   • Years of education: Not on file   • Highest education level: Not on file   Tobacco Use   • Smoking  status: Never Smoker   • Smokeless tobacco: Never Used   Substance and Sexual Activity   • Alcohol use: Yes     Comment: rare      Family History   Problem Relation Age of Onset   • Cancer Mother    • Heart disease Mother    • Obesity Mother    • Diabetes Father    • Hypertension Father    • Heart disease Father    • Cancer Paternal Grandfather       Allergies   Allergen Reactions   • Bactrim [Sulfamethoxazole-Trimethoprim] Rash          Current Outpatient Medications:   •  FLUoxetine (PROzac) 20 MG tablet, , Disp: , Rfl:   •  levothyroxine (SYNTHROID, LEVOTHROID) 75 MCG tablet, , Disp: , Rfl:   •  NORETHINDRONE PO, Take  by mouth., Disp: , Rfl:   •  buPROPion SR (WELLBUTRIN SR) 150 MG 12 hr tablet, Daily., Disp: , Rfl: 3  •  fluticasone (FLOVENT HFA) 44 MCG/ACT inhaler, Inhale 2 puffs Daily., Disp: 10.6 g, Rfl: 11    Objective     Vital Signs  Temp:  [97.5 °F (36.4 °C)] 97.5 °F (36.4 °C)  Heart Rate:  [95] 95  BP: (149)/(96) 149/96  Body mass index is 52.39 kg/m².      04/06/21  1335   Weight: (!) 141 kg (310 lb)       Physical Exam  Vitals reviewed.   Constitutional:       Appearance: She is obese.   Cardiovascular:      Rate and Rhythm: Normal rate and regular rhythm.   Pulmonary:      Effort: Pulmonary effort is normal.   Abdominal:      General: Bowel sounds are normal.      Palpations: Abdomen is soft.   Musculoskeletal:         General: Normal range of motion.   Skin:     General: Skin is warm and dry.   Neurological:      Mental Status: She is alert and oriented to person, place, and time.   Psychiatric:         Mood and Affect: Mood normal.         Behavior: Behavior normal.            Results Review:   I reviewed the patient's new clinical results.      Patient's Body mass index is 52.39 kg/m². BMI is above normal parameters. Recommendations include: educational material, exercise counseling and nutrition counseling.      Assessment/Plan   Problem List Items Addressed This Visit        Endocrine and  "Metabolic    Class 3 severe obesity due to excess calories with serious comorbidity and body mass index (BMI) of 50.0 to 59.9 in adult (CMS/[facility]) - Primary       Gastrointestinal Abdominal     Gastroesophageal reflux disease without esophagitis             I discussed the patient's findings and my recommendations with patient.   She has been provided a structured dietary regimen based off of her behavior.  I discussed with the patient the etiology of the disease of obesity and the potential comorbid conditions associated with this disease.  She was instructed to follow the dietary regimen and follow-up with our program in 1 month's time with any additional questions as they may arise during this time.  We emphasized on focusing on proteins and meals high in fiber as well as adequate hydration that exceed 64 ounces of water daily.    I explained that I anticipate the patient to lose weight prior to her next monthly visit.  I have also explained that they need to record or document when they are going to have the \"cheat day\".    A total of 30 minutes was spent face to face with this patient and over half of the time was spent on counseling and coordination of care for the disease of obesity. We specifically reviewed the dietary prescription and I made recommendations toward increasing exercise as tolerated as well as focusing on training their behavior toward storing less.    I have also recommended that she obtain cardiac risk assessment and psychiatric evaluation prior to surgery consideration.    She will see the dietitian today.  Patient is to return in 1 month to see Dr. Hyde and discuss endoscopy.    Jacqueline Rush, SHERRY     04/06/21  14:45 CDT    "

## 2021-04-06 NOTE — PROGRESS NOTES
"Nutrition Services    Patient Name:  Pallavi Singh  YOB: 1980  MRN: [id number]  Admit Date:  (Not on file)    NUTRITION BARIATRIC/MWL NOTE     Visit 1  Initial Assessment   BA#   MWL    Anthropometrics   Height: 64.5 in  Weight: 310 lbs  BMI:52.39      Nutrition Recall  Eating __2____ meals daily   Snacking-varies  Limited sweet intake-only if in the house  Drinking less than 64 fluid ounces    Education    Goal Setting and Information Packet  4 meal per day diet plan  4 meal per day sample menu  \"Perfect Protein, Fiber in Foods, and Reducing Fat\"  Reinforce Nutritional Needs for Surgery  Reinforce Nutritional Needs for MWL    Nutrition Goals   Eat ___4__ meals per day with protein  Eat protein first at meals  Protein goal: 65gms   discussed protein guidelines for shakes and bar  Eliminate snacks  Healthier food choices  Portion control / Use smaller plate or measuring cup   Increase fluid intake to 64 ounces per day          Electronically signed by:  Cynthia Cabrera  04/06/21 15:59 CDT   "

## 2021-04-30 ENCOUNTER — TELEPHONE (OUTPATIENT)
Dept: BARIATRICS/WEIGHT MGMT | Facility: CLINIC | Age: 41
End: 2021-04-30

## 2021-05-03 ENCOUNTER — OFFICE VISIT (OUTPATIENT)
Dept: BARIATRICS/WEIGHT MGMT | Facility: CLINIC | Age: 41
End: 2021-05-03

## 2021-05-03 VITALS
DIASTOLIC BLOOD PRESSURE: 92 MMHG | WEIGHT: 293 LBS | HEIGHT: 65 IN | OXYGEN SATURATION: 96 % | HEART RATE: 92 BPM | TEMPERATURE: 98 F | SYSTOLIC BLOOD PRESSURE: 129 MMHG | BODY MASS INDEX: 48.82 KG/M2

## 2021-05-03 DIAGNOSIS — K21.9 GASTROESOPHAGEAL REFLUX DISEASE WITHOUT ESOPHAGITIS: ICD-10-CM

## 2021-05-03 DIAGNOSIS — E66.01 CLASS 3 SEVERE OBESITY DUE TO EXCESS CALORIES WITH SERIOUS COMORBIDITY AND BODY MASS INDEX (BMI) OF 50.0 TO 59.9 IN ADULT (HCC): Primary | ICD-10-CM

## 2021-05-03 PROCEDURE — 99213 OFFICE O/P EST LOW 20 MIN: CPT | Performed by: SURGERY

## 2021-05-03 NOTE — PROGRESS NOTES
"Patient Care Team:  Saad Horne MD as PCP - General (Family Medicine)  Kevyn, Yair Bassett MD as Consulting Physician (Otolaryngology)    Reason for Visit:  Surgical Weight loss      Subjective   Pallavi Singh is a 40 y.o. female.     Pallavi is here for follow-up and continued medical management of her morbid obesity.  She is currently on a prescription diet.  Pallavi previously was to apply dietary changes such as following the meal plan as directed.  She admits to struggling to follow the dietary prescription.  As a result she gained weight since her last visit.    Review Of Systems:  General ROS: positive for  - fatigue and sleep disturbance  Respiratory ROS: no cough, shortness of breath, or wheezing  Cardiovascular ROS: no chest pain or dyspnea on exertion  Gastrointestinal ROS: no abdominal pain, change in bowel habits, or black or bloody stools    The following portions of the patient's history were reviewed and updated as appropriate: allergies, current medications, past family history, past medical history, past social history, past surgical history and problem list.    Objective   /92 (BP Location: Right arm, Patient Position: Sitting, Cuff Size: Adult)   Pulse 92   Temp 98 °F (36.7 °C)   Ht 163.8 cm (64.5\")   Wt (!) 141 kg (310 lb 9.6 oz)   SpO2 96%   BMI 52.49 kg/m²       05/03/21  1345   Weight: (!) 141 kg (310 lb 9.6 oz)       General Appearance:  awake, alert, oriented, in no acute distress  Lungs:  Normal expansion.  Clear to auscultation.  No rales, rhonchi, or wheezing.  Heart:  Heart regular rate and rhythm  Abdomen:  Soft, non-tender, normal bowel sounds; no bruits, organomegaly or masses.  Abnormal shape: obese    Assessment/Plan     Encounter Diagnoses   Name Primary?   • Class 3 severe obesity due to excess calories with serious comorbidity and body mass index (BMI) of 50.0 to 59.9 in adult (CMS/HCC) Yes   • Gastroesophageal reflux disease without esophagitis  "       Pallavi Singh was seen today for follow-up, obesity, nutrition counseling and weight loss.  She has no significant weight loss or change in her weight since her last visit.  Today we discussed healthy changes in lifestyle, diet, and exercise. Dietician consultation obtained.  Pallavi Singh had received handouts to her explaining the recommendation on portion sizes/appetite control/reading nutrition labels.   Intensive behavioral therapy for obesity was done today as well.   Goals for this month are: Follow the dietary prescription as directed as well as creating a food journal.    Follow up in one month for a weight recheck.

## 2021-06-25 ENCOUNTER — TELEPHONE (OUTPATIENT)
Dept: BARIATRICS/WEIGHT MGMT | Facility: CLINIC | Age: 41
End: 2021-06-25

## 2022-10-24 NOTE — PATIENT INSTRUCTIONS
Patient Education        Well Visit, Ages 25 to 72: Care Instructions  Well visits can help you stay healthy. Your doctor has checked your overall health and may have suggested ways to take good care of yourself. Your doctor also may have recommended tests. You can help prevent illness with healthy eating, good sleep, vaccinations, regular exercise, and other steps. Get the tests that you and your doctor decide on. Depending on your age and risks, examples might include screening for diabetes; hepatitis C; HIV; and cervical, breast, lung, and colon cancer. Screening helps find diseases before any symptoms appear. Eat healthy foods. Choose fruits, vegetables, whole grains, lean protein, and low-fat dairy foods. Limit saturated fat and reduce salt. Limit alcohol. Men should have no more than 2 drinks a day. Women should have no more than 1. For some people, no alcohol is the best choice. Exercise. Get at least 30 minutes of exercise on most days of the week. Walking can be a good choice. Reach and stay at your healthy weight. This will lower your risk for many health problems. Take care of your mental health. Try to stay connected with friends, family, and community, and find ways to manage stress. If you're feeling depressed or hopeless, talk to someone. A counselor can help. If you don't have a counselor, talk to your doctor. Talk to your doctor if you think you may have a problem with alcohol or drug use. This includes prescription medicines and illegal drugs. Avoid tobacco and nicotine: Don't smoke, vape, or chew. If you need help quitting, talk to your doctor. Practice safer sex. Getting tested, using condoms or dental dams, and limiting sex partners can help prevent STIs. Use birth control if it's important to you to prevent pregnancy. Talk with your doctor about your choices and what might be best for you. Prevent problems where you can.  Protect your skin from too much sun, wash your hands, brush your teeth twice a day, and wear a seat belt in the car. Where can you learn more? Go to https://chpepiceweb.Symphony Concierge. org and sign in to your [employer] account. Enter P072 in the [facility] box to learn more about \"Well Visit, Ages 25 to 72: Care Instructions. \"     If you do not have an account, please click on the \"Sign Up Now\" link. Current as of: March 9, 2022               Content Version: 13.4  © 3376-5047 [employer]. Care instructions adapted under license by [facility] ([facility]). If you have questions about a medical condition or this instruction, always ask your healthcare professional. [address] any warranty or liability for your use of this information. Patient Education        Breast Self-Exam: Care Instructions  Your Care Instructions     A breast self-exam is when you check your breasts for lumps or changes. This regular exam helps you learn how your breasts normally look and feel. Most breast problems or changes are not because of cancer. Breast self-exam is not a substitute for a mammogram. Having regular breast exams by your doctor and regular mammograms improve your chances of finding any problems with your breasts. Some women set a time each month to do a step-by-step breast self-exam. Other women like a less formal system. They might look at their breasts as they brush their teeth, or feel their breasts once in a while in the shower. If you notice a change in your breast, tell your doctor. Follow-up care is a key part of your treatment and safety. Be sure to make and go to all appointments, and call your doctor if you are having problems. It's also a good idea to know your test results and keep a list of the medicines you take. How do you do a breast self-exam?  The best time to examine your breasts is usually one week after your menstrual period begins. Your breasts should not be tender then.  If you do not have periods, you might do your exam on a day of the month that is easy to remember. To examine your breasts:  Remove all your clothes above the waist and lie down. When you are lying down, your breast tissue spreads evenly over your chest wall, which makes it easier to feel all your breast tissue. Use the pads--not the fingertips--of the 3 middle fingers of your left hand to check your right breast. Move your fingers slowly in small coin-sized circles that overlap. Use three levels of pressure to feel of all your breast tissue. Use light pressure to feel the tissue close to the skin surface. Use medium pressure to feel a little deeper. Use firm pressure to feel your tissue close to your breastbone and ribs. Use each pressure level to feel your breast tissue before moving on to the next spot. Check your entire breast, moving up and down as if following a strip from the collarbone to the bra line, and from the armpit to the ribs. Repeat until you have covered the entire breast.  Repeat this procedure for your left breast, using the pads of the 3 middle fingers of your right hand. To examine your breasts while in the shower:  Place one arm over your head and lightly soap your breast on that side. Using the pads of your fingers, gently move your hand over your breast (in the strip pattern described above), feeling carefully for any lumps or changes. Repeat for the other breast.  Have your doctor inspect anything you notice to see if you need further testing. Where can you learn more? Go to https://Bloomerangpaulo.Sharypic. org and sign in to your [employer] account. Enter P148 in the [facility] box to learn more about \"Breast Self-Exam: Care Instructions. \"     If you do not have an account, please click on the \"Sign Up Now\" link. Current as of: November 22, 2021               Content Version: 13.4  © 6508-5536 Healthwise[employer]orporated. Care instructions adapted under license by [facility] [address]  If you have questions about a medical condition or this instruction, always ask your healthcare professional. [address] any warranty or liability for your use of this information. Patient Education        A Healthy Lifestyle: Care Instructions  A healthy lifestyle can help you feel good, have more energy, and stay at a weight that's healthy for you. You can share a healthy lifestyle with your friends and family. And you can do it on your own. Eat meals with your friends or family. You could try cooking together. Plan activities with other people. Go for a walk with a friend, try a free online fitness class, or join a sports league. Eat a variety of healthy foods. These include fruits, vegetables, whole grains, low-fat dairy, and lean protein. Choose healthy portions of food. You can use the Nutrition Facts label on food packages as a guide. Eat more fruits and vegetables. You could add vegetables to sandwiches or add fruit to cereal.  Drink water when you are thirsty. Limit soda, juice, and sports drinks. Try to exercise most days. Aim for at least 2½ hours of exercise each week. Keep moving. Work in the garden or take your dog on a walk. Use the stairs instead of the elevator. If you use tobacco or nicotine, try to quit. Ask your doctor about programs and medicines to help you quit. Limit alcohol. Men should have no more than 2 drinks a day. Women should have no more than 1. For some people, no alcohol is the best choice. Follow-up care is a key part of your treatment and safety. Be sure to make and go to all appointments, and call your doctor if you are having problems. It's also a good idea to know your test results and keep a list of the medicines you take. Where can you learn more? Go to https://derrick.healthViaWest. org and sign in to your [employer] account. Enter T523 in the [facility] box to learn more about \"A Healthy Lifestyle: Care Instructions. \"     If you do not have an account, please click on the \"Sign Up Now\" link. Current as of: March 9, 2022               Content Version: 13.4  © 2006-2022 [employer]. Care instructions adapted under license by [facility] [address] If you have questions about a medical condition or this instruction, always ask your healthcare professional. [address] any warranty or liability for your use of this information. Patient Education        Mammogram: About This Test  What is it? A mammogram is an X-ray of the breast that is used to screen for breast cancer. This test can find tumors that are too small for you or your doctor to feel. Cancer is most easily treated when it is found at an early stage. Why is this test done? A mammogram is done to:  Look for breast cancer when there are no symptoms. Find breast cancer when there are symptoms. Symptoms of breast cancer may include a lump or thickening in the breast, nipple discharge, or dimpling of the skin on one area of the breast.  Find an area of suspicious breast tissue to remove for an exam under a microscope (biopsy). How do you prepare for the test?  If you've had a mammogram before at another clinic, have the results sent or bring them with you to your appointment. On the day of the mammogram, don't use any deodorant. And don't use perfume, powders, or ointments near or on your breasts. The residue left on your skin by these substances may interfere with the X-rays. How is the test done? You will need to take off any jewelry that might interfere with the X-ray pictures. You will need to take off your clothes above the waist.  You will be given a cloth or paper gown to use during the test.  You probably will stand during the mammogram.  One at a time, your breasts will be placed on a flat plate. Another plate is then pressed firmly against your breast to help flatten out the breast tissue. You may be asked to lift your arm.   For a few seconds while the X-ray picture is being taken, you will need to hold your breath. At least two pictures are taken of each breast. One is taken from the top and one from the side. How does having a mammogram feel? A mammogram is often uncomfortable but rarely painful. If you have sensitive or fragile skin or a skin condition, let the technician know before you have your exam. If you have menstrual periods, the procedure is more comfortable when done within 2 weeks after your period has ended. Having your breasts flattened is usually uncomfortable, but it helps the technician get the best images. How long does the test take? The test will take about 10 to 15 minutes. You may be in the clinic for up to an hour. You may be asked to wait a few minutes while the images are checked to make sure they don't need to be redone. What happens after the test?  You will probably be able to go home right away. You can go back to your usual activities right away. Follow-up care is a key part of your treatment and safety. Be sure to make and go to all appointments, and call your doctor if you are having problems. It's also a good idea to keep a list of the medicines you take. Ask your doctor when you can expect to have your test results. Where can you learn more? Go to https://Jambotech.BonitaSoft. org and sign in to your [employer] account. Enter E676 in the [facility] box to learn more about \"Mammogram: About This Test.\"     If you do not have an account, please click on the \"Sign Up Now\" link. Current as of: November 22, 2021               Content Version: 13.4  © 7122-0074 Healthwise, Incorporated. Care instructions adapted under license by [facility] ([facility]). If you have questions about a medical condition or this instruction, always ask your healthcare professional. [address] any warranty or liability for your use of this information.

## 2022-10-25 ENCOUNTER — OFFICE VISIT (OUTPATIENT)
Dept: OBGYN CLINIC | Age: 42
End: 2022-10-25
Payer: COMMERCIAL

## 2022-10-25 VITALS
BODY MASS INDEX: 48.82 KG/M2 | HEIGHT: 65 IN | DIASTOLIC BLOOD PRESSURE: 90 MMHG | HEART RATE: 110 BPM | SYSTOLIC BLOOD PRESSURE: 138 MMHG | WEIGHT: 293 LBS

## 2022-10-25 DIAGNOSIS — Z12.31 ENCOUNTER FOR SCREENING MAMMOGRAM FOR MALIGNANT NEOPLASM OF BREAST: ICD-10-CM

## 2022-10-25 DIAGNOSIS — Z12.4 CERVICAL CANCER SCREENING: ICD-10-CM

## 2022-10-25 DIAGNOSIS — Z80.3 FAMILY HISTORY OF BREAST CANCER: ICD-10-CM

## 2022-10-25 DIAGNOSIS — Z01.419 ENCOUNTER FOR CERVICAL PAP SMEAR WITH PELVIC EXAM: Primary | ICD-10-CM

## 2022-10-25 DIAGNOSIS — Z76.89 ENCOUNTER TO ESTABLISH CARE: ICD-10-CM

## 2022-10-25 DIAGNOSIS — Z11.51 ENCOUNTER FOR SCREENING FOR HUMAN PAPILLOMAVIRUS (HPV): ICD-10-CM

## 2022-10-25 DIAGNOSIS — R10.2 PELVIC PAIN: ICD-10-CM

## 2022-10-25 PROCEDURE — 99386 PREV VISIT NEW AGE 40-64: CPT | Performed by: ADVANCED PRACTICE MIDWIFE

## 2022-10-25 ASSESSMENT — ENCOUNTER SYMPTOMS
GASTROINTESTINAL NEGATIVE: 1
ALLERGIC/IMMUNOLOGIC NEGATIVE: 1
RESPIRATORY NEGATIVE: 1
EYES NEGATIVE: 1
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
## 2022-10-25 NOTE — PROGRESS NOTES
Pt presents today for pap smear and breast exam.  She also complains of pelvic pain. Not currently    Last mammogram: , normal- d/t family hx  Last pap smear: 2/15/2018, negative pap  Sexually active: Yes  Sexual preference: Male  Contraception: OCP  : 0  Para: 0  AB: 0  Last bone density: never   Last colonoscopy: never  Menarche: 16years old  LMP: 10/6/2022  Menses: for the most part, as she has gotten older, they have become oddly light.

## 2022-10-28 LAB
HPV COMMENT: NORMAL
HPV TYPE 16: NOT DETECTED
HPV TYPE 18: NOT DETECTED
HPVOH (OTHER TYPES): NOT DETECTED

## 2023-01-31 ENCOUNTER — PATIENT ROUNDING (BHMG ONLY) (OUTPATIENT)
Dept: FAMILY MEDICINE CLINIC | Facility: CLINIC | Age: 43
End: 2023-01-31
Payer: MEDICAID

## 2023-01-31 ENCOUNTER — OFFICE VISIT (OUTPATIENT)
Dept: FAMILY MEDICINE CLINIC | Facility: CLINIC | Age: 43
End: 2023-01-31
Payer: MEDICAID

## 2023-01-31 VITALS
HEART RATE: 103 BPM | DIASTOLIC BLOOD PRESSURE: 84 MMHG | SYSTOLIC BLOOD PRESSURE: 122 MMHG | WEIGHT: 293 LBS | TEMPERATURE: 97.8 F | OXYGEN SATURATION: 100 % | BODY MASS INDEX: 48.82 KG/M2 | HEIGHT: 65 IN

## 2023-01-31 DIAGNOSIS — M99.01 SOMATIC DYSFUNCTION OF SPINE, CERVICAL: ICD-10-CM

## 2023-01-31 DIAGNOSIS — M99.08 SEGMENTAL AND SOMATIC DYSFUNCTION OF RIB CAGE: ICD-10-CM

## 2023-01-31 DIAGNOSIS — M99.00 SOMATIC DYSFUNCTION OF HEAD REGION: ICD-10-CM

## 2023-01-31 DIAGNOSIS — M99.07 SOMATIC DYSFUNCTION OF UPPER EXTREMITY: ICD-10-CM

## 2023-01-31 DIAGNOSIS — M99.02 SOMATIC DYSFUNCTION OF SPINE, THORACIC: ICD-10-CM

## 2023-01-31 DIAGNOSIS — M54.12 CERVICAL RADICULOPATHY: Primary | ICD-10-CM

## 2023-01-31 PROCEDURE — 98927 OSTEOPATH MANJ 5-6 REGIONS: CPT | Performed by: FAMILY MEDICINE

## 2023-01-31 PROCEDURE — 99204 OFFICE O/P NEW MOD 45 MIN: CPT | Performed by: FAMILY MEDICINE

## 2023-01-31 RX ORDER — ATOMOXETINE 25 MG/1
25 CAPSULE ORAL DAILY
COMMUNITY
End: 2023-03-01 | Stop reason: SDUPTHER

## 2023-01-31 RX ORDER — GABAPENTIN 300 MG/1
300 CAPSULE ORAL 3 TIMES DAILY
Qty: 90 CAPSULE | Refills: 2 | Status: SHIPPED | OUTPATIENT
Start: 2023-01-31

## 2023-01-31 NOTE — PROGRESS NOTES
January 31, 2023    Hello, may I speak with Pallavi Singh?    My name is Jessika     I am  with [facility]  [address] [place] [id number] [phone].    Before we get started may I verify your date of birth? 1980    I am calling to officially welcome you to our practice and ask about your recent visit. Is this a good time to talk? yes    Tell me about your visit with us. What things went well? all good        We're always looking for ways to make our patients' experiences even better. Do you have recommendations on ways we may improve?  no    Overall were you satisfied with your first visit to our practice? yes       I appreciate you taking the time to speak with me today. Is there anything else I can do for you? no      Thank you, and have a great day.

## 2023-02-09 ENCOUNTER — HOSPITAL ENCOUNTER (OUTPATIENT)
Dept: MRI IMAGING | Facility: HOSPITAL | Age: 43
Discharge: HOME | End: 2023-02-09
Admitting: FAMILY MEDICINE
Payer: MEDICAID

## 2023-02-09 DIAGNOSIS — M54.12 CERVICAL RADICULOPATHY: ICD-10-CM

## 2023-02-09 PROCEDURE — 72141 MRI NECK SPINE W/O DYE: CPT

## 2023-02-12 NOTE — PROGRESS NOTES
"Chief Complaint  Establish Care and Arm Pain    Subjective        Pallavi Singh presents to [facility]  History of Present Illness  Moderate to severe burning pain along neck and R lateral elbow for several weeks, worse with bending, arm use, transitions. No muscle weakness     Objective   Vital Signs:  /84 (BP Location: Left arm)   Pulse 103   Temp 97.8 °F (36.6 °C) (Temporal)   Ht 163.8 cm (64.5\")   Wt (!) 143 kg (314 lb 3.2 oz)   SpO2 100%   BMI 53.10 kg/m²   Estimated body mass index is 53.1 kg/m² as calculated from the following:    Height as of this encounter: 163.8 cm (64.5\").    Weight as of this encounter: 143 kg (314 lb 3.2 oz).             Physical Exam  Vitals and nursing note reviewed.   Constitutional:       General: She is not in acute distress.     Appearance: She is not diaphoretic.   HENT:      Head: Normocephalic and atraumatic.      Nose: Nose normal.   Eyes:      General: No scleral icterus.        Right eye: No discharge.         Left eye: No discharge.      Conjunctiva/sclera: Conjunctivae normal.   Neck:      Trachea: No tracheal deviation.   Pulmonary:      Effort: Pulmonary effort is normal.   Skin:     General: Skin is warm and dry.      Coloration: Skin is not pale.   Neurological:      Mental Status: She is alert and oriented to person, place, and time.      Sensory: Sensory deficit: C6 on R.      Motor: No weakness.      Coordination: Coordination normal.      Deep Tendon Reflexes: Reflexes normal.      Comments: Positive spurling's   Psychiatric:         Behavior: Behavior normal.         Thought Content: Thought content normal.         Judgment: Judgment normal.      Osteopathic Structural Exam  Procedure Note for Osteopathic Manipulative Treatment    Pre-procedure diagnoses: Somatic dysfunctions as listed below.  Consent: Oral consent given for Osteopathic Treatment  Post-procedure diagnoses: same  Complications of procedure: none, " patient tolerated procedure well    The evaluation including the history, physical exam and the management decisions, indicate than an appropriate intervention on this date of service is osteopathic manipulative treatment. Oral permission for the osteopathic procedure was obtained. The following treatment methods and the responses for each body region are listed below.        Region Somatic Dysfunction Severity OMT technique Response      Head OA ESrRl Moderate Osteopathy in the cranial field Improved      Cervical C5 FRSR  C6 ERSL Moderate Balanced ligamentous tension Improved      Thoracic  Thoracic inlet FSrRl  T7 FRSL Moderate  Balanced ligamentous tension  Improved       Upper Extremities  R posterior radial head  Moderate  Balanced ligamentous tension  Improved       Rib Cage  R  rib 4 posterior somatic dysfunction  Moderate  Balanced ligamentous tension  Improved        Result Review :                   Assessment and Plan   Diagnoses and all orders for this visit:    1. Cervical radiculopathy (Primary)  -     MRI Cervical Spine Without Contrast; Future  -     Ambulatory Referral to Physical Therapy Evaluate and treat  -     gabapentin (NEURONTIN) 300 MG capsule; Take 1 capsule by mouth 3 (Three) Times a Day.  Dispense: 90 capsule; Refill: 2    2. Somatic dysfunction of head region    3. Somatic dysfunction of spine, cervical    4. Somatic dysfunction of spine, thoracic    5. Segmental and somatic dysfunction of rib cage    6. Somatic dysfunction of upper extremity    OMT to balance autonomic tone, improve fascial symmetry and respiratory/circulatory/lymphatic compliance  F/u 8 weeks

## 2023-02-14 ENCOUNTER — TREATMENT (OUTPATIENT)
Dept: PHYSICAL THERAPY | Facility: CLINIC | Age: 43
End: 2023-02-14
Payer: MEDICAID

## 2023-02-14 DIAGNOSIS — M79.2 RADICULAR PAIN IN RIGHT ARM: ICD-10-CM

## 2023-02-14 DIAGNOSIS — M54.12 RADICULOPATHY, CERVICAL: Primary | ICD-10-CM

## 2023-02-14 PROCEDURE — 97110 THERAPEUTIC EXERCISES: CPT | Performed by: PHYSICAL THERAPIST

## 2023-02-14 PROCEDURE — 97162 PT EVAL MOD COMPLEX 30 MIN: CPT | Performed by: PHYSICAL THERAPIST
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
## 2023-02-17 ENCOUNTER — TREATMENT (OUTPATIENT)
Dept: PHYSICAL THERAPY | Facility: CLINIC | Age: 43
End: 2023-02-17
Payer: MEDICAID

## 2023-02-17 DIAGNOSIS — M79.2 RADICULAR PAIN IN RIGHT ARM: ICD-10-CM

## 2023-02-17 DIAGNOSIS — M54.12 RADICULOPATHY, CERVICAL: Primary | ICD-10-CM

## 2023-02-17 PROCEDURE — 97140 MANUAL THERAPY 1/> REGIONS: CPT | Performed by: PHYSICAL THERAPIST

## 2023-02-17 NOTE — PROGRESS NOTES
Physical Therapy Treatment Note  [address] [place] [address]    Patient: Pallavi Singh                                                 Visit Date: 2023  :     1980    Referring practitioner:    Florentino Medina DO  Date of Initial Visit:          Type: THERAPY  Noted: 2023    Patient seen for 2 sessions    Visit Diagnoses:    ICD-10-CM ICD-9-CM   1. Radiculopathy, cervical  M54.12 723.4   2. Radicular pain in right arm  M79.2 729.2     SUBJECTIVE     Subjective She feels the Gabapentin is making her feel sluggish.     PAIN: 5-6/10         OBJECTIVE     Objective        Manual Therapy     30976  Comments   Prone CT ext/rot mobs This relieved right arm   Prone alecia UT STM    Supine manual cervical traction    Right mid to lower cervical SG  This relieved right arm some       Timed Minutes 45        Therapy Education/Self Care 13337   Education offered today Educated on anatomy of MRI and how it affects her right arm.   Focus on scapular and very gentle cervical retraction.   Try right SB if this helps her right arm   Neeru Code    Ongoing HEP   Access Code: [email or web address]  URL: [email or web address]  Date: 2023  Prepared by: Daria Carlos    Exercises  Supine Cervical Retraction with Towel - 2 x daily - 7 x weekly - 2-3 minutes hold  Seated Passive Cervical Retraction - 2 x daily - 7 x weekly - 3 sets - 10 reps  Shoulder External Rotation and Scapular Retraction - 2 x daily - 7 x weekly - 3 sets - 10 reps  Seated Cervical Rotation AROM - 2 x daily - 7 x weekly - 3 sets - 10 reps  Seated Cervical Sidebending AROM - 2 x daily - 7 x weekly - 3 sets - 10 reps     Timed Minutes        Total Timed Treatment:     45   mins  Total Time of Visit:            45   mins    ASSESSMENT/PLAN     GOALS:        Goals                                                    Progress Note due by 3/14/23                                                                 Recert Due: 5/14/23    STG by: 3 weeks Comments Status Date   Improve mobility throughout thoracic spine and CT junction.        Decreased muscle guarding  throughout neck and shoulder girdle.        Demonstrate improved sitting posture with minimal cues needed.        Understands appropriate stretches and position changes to reduce symptoms.         LTG by: 6 weeks        Reports no radicular symptoms for a week  still with radicular symptoms consistently in right UE ongoing 2/17   Report no neck/shoulder pain except occasional minor twinges no more than 2-3/10        Understands improved ergonomics for work and HEP for flexibility and posture        Improve score on Quick DASH by at least 12.                    Assessment/Plan     ASSESSMENT: Today was her first visit after her evaluation. Her symptoms are consistent with the MRI finding of the large C6/7 HNP with right severe foraminal stenosis.     PLAN: continue emphasis on manual therapy. May assess forearm for any kind of entrapment but focus on neck.     SIGNATURE: Av Norris, PT, [place] License #: [id number]  Electronically Signed on 2/17/2023        [address] [place] [address] [place] [address]  [phone]

## 2023-02-18 DIAGNOSIS — M54.12 CERVICAL RADICULOPATHY: Primary | ICD-10-CM

## 2023-02-28 ENCOUNTER — TREATMENT (OUTPATIENT)
Dept: PHYSICAL THERAPY | Facility: CLINIC | Age: 43
End: 2023-02-28
Payer: MEDICAID

## 2023-02-28 DIAGNOSIS — M79.2 RADICULAR PAIN IN RIGHT ARM: ICD-10-CM

## 2023-02-28 DIAGNOSIS — M54.12 RADICULOPATHY, CERVICAL: Primary | ICD-10-CM

## 2023-02-28 PROCEDURE — 97140 MANUAL THERAPY 1/> REGIONS: CPT

## 2023-02-28 NOTE — PROGRESS NOTES
Physical Therapy Treatment Note  [address] [place] [address]    Patient: Pallavi Singh                                                 Visit Date: 2023  :     1980    Referring practitioner:    Florentino Medina DO  Date of Initial Visit:          Type: THERAPY  Noted: 2023    Patient seen for 3 sessions    Visit Diagnoses:    ICD-10-CM ICD-9-CM   1. Radiculopathy, cervical  M54.12 723.4   2. Radicular pain in right arm  M79.2 729.2     SUBJECTIVE     Subjective She feels worse today. At worst her radicular symptoms will be in all joints into her knuckles but today she also has a R sided headache at the base of her head. She has a doctor's appt tomorrow. She's doing okay with the gabapentin but has unfortunate reactions to it.     PAIN: 7-8/10     OBJECTIVE     Objective   Manual Therapy     15093  Comments   STM w/ massage cream to B UT/LS and suboccipitals prone   Prone CT mobilizations Reduced radicular symptoms   cervical manual distraction Supine over towel roll   Suboccipital release  Supine over towel roll       Timed Minutes 25        Therapy Education/Self Care 51032   Education offered today Discussed expectations for therapy and benefits of pain management; advised she lie over towel roll or pool noodle; advised she perform suboccipital release with 2 tennis balls in a sock   Medbride Code    Ongoing HEP   Access Code: [email or web address]  URL: [email or web address]  Date: 2023  Prepared by: Daria Carlos    Exercises  Supine Cervical Retraction with Towel - 2 x daily - 7 x weekly - 2-3 minutes hold  Seated Passive Cervical Retraction - 2 x daily - 7 x weekly - 3 sets - 10 reps  Shoulder External Rotation and Scapular Retraction - 2 x daily - 7 x weekly - 3 sets - 10 reps  Seated Cervical Rotation AROM - 2 x daily - 7 x weekly - 3 sets - 10 reps  Seated Cervical Sidebending AROM - 2 x daily - 7 x weekly - 3 sets -  10 reps     Timed Minutes 5       Total Timed Treatment:    30   mins  Total Time of Visit:            30   mins    ASSESSMENT/PLAN     GOALS:        Goals                                                    Progress Note due by 3/14/23                                                                Recert Due: 5/14/23    STG by: 3 weeks Comments Status Date   Improve mobility throughout thoracic spine and CT junction.        Decreased muscle guarding  throughout neck and shoulder girdle.        Demonstrate improved sitting posture with minimal cues needed.        Understands appropriate stretches and position changes to reduce symptoms.         LTG by: 6 weeks        Reports no radicular symptoms for a week  still with radicular symptoms consistently in right UE ongoing 2/17   Report no neck/shoulder pain except occasional minor twinges no more than 2-3/10        Understands improved ergonomics for work and HEP for flexibility and posture        Improve score on Quick DASH by at least 12.                    Assessment/Plan     ASSESSMENT: She is really responding well to mobilization stating it reduces her radicular pain indicating need for improved spinal mobility and alignment.     PLAN: continue emphasis on manual therapy. May assess forearm for any kind of entrapment but focus on neck. Consider Scratch-Collapse testing and light postural strengthening.      SIGNATURE: Mesha Ha PTA, [place] License #: [id number]  Electronically Signed on 2/28/2023        [address] [place] [address] [place] [address]  [phone]

## 2023-03-01 ENCOUNTER — OFFICE VISIT (OUTPATIENT)
Dept: FAMILY MEDICINE CLINIC | Facility: CLINIC | Age: 43
End: 2023-03-01
Payer: MEDICAID

## 2023-03-01 VITALS
WEIGHT: 293 LBS | BODY MASS INDEX: 48.82 KG/M2 | HEART RATE: 99 BPM | TEMPERATURE: 96.7 F | HEIGHT: 65 IN | SYSTOLIC BLOOD PRESSURE: 130 MMHG | OXYGEN SATURATION: 98 % | DIASTOLIC BLOOD PRESSURE: 80 MMHG

## 2023-03-01 DIAGNOSIS — M50.223 HERNIATED NUCLEUS PULPOSUS, C6-7 RIGHT: ICD-10-CM

## 2023-03-01 DIAGNOSIS — F90.9 ATTENTION DEFICIT HYPERACTIVITY DISORDER (ADHD), UNSPECIFIED ADHD TYPE: Primary | ICD-10-CM

## 2023-03-01 PROCEDURE — 99214 OFFICE O/P EST MOD 30 MIN: CPT | Performed by: FAMILY MEDICINE

## 2023-03-01 RX ORDER — ATOMOXETINE 40 MG/1
40 CAPSULE ORAL DAILY
Qty: 30 CAPSULE | Refills: 2 | Status: SHIPPED | OUTPATIENT
Start: 2023-03-01

## 2023-03-01 RX ORDER — ATOMOXETINE 25 MG/1
25 CAPSULE ORAL DAILY
Qty: 30 CAPSULE | Refills: 2 | Status: SHIPPED | OUTPATIENT
Start: 2023-03-01 | End: 2023-03-01

## 2023-03-02 ENCOUNTER — TELEPHONE (OUTPATIENT)
Dept: FAMILY MEDICINE CLINIC | Facility: CLINIC | Age: 43
End: 2023-03-02

## 2023-03-02 RX ORDER — TRAMADOL HYDROCHLORIDE 50 MG/1
50 TABLET ORAL EVERY 8 HOURS PRN
Qty: 60 TABLET | Refills: 0 | Status: SHIPPED | OUTPATIENT
Start: 2023-03-02

## 2023-03-02 NOTE — TELEPHONE ENCOUNTER
Patient requesting Rx for tramadol, says that this was discussed with PCP at yesterday's OV.    I did not see where this medication was sent into patient's pharmacy.   Please advise

## 2023-03-02 NOTE — TELEPHONE ENCOUNTER
Caller: Pallavi Singh    Relationship: Self    Best call back number:  [phone] - ok for vm     What medication are you requesting: pt said that Tramadol was discussed during visit; she was under the impression that it was going to be sent to pharmacy after visit yesterday     What are your current symptoms: pain (neck,shoulder, arm mostly)    If a prescription is needed, what is your preferred pharmacy and phone number: "[id number] - [place], [place] - [address]  - [phone] [facility] [phone]      Additional notes: please call pt if this is not going to be prescribed so that she knows what to expect.

## 2023-03-03 ENCOUNTER — TREATMENT (OUTPATIENT)
Dept: PHYSICAL THERAPY | Facility: CLINIC | Age: 43
End: 2023-03-03
Payer: MEDICAID

## 2023-03-03 DIAGNOSIS — M54.12 RADICULOPATHY, CERVICAL: Primary | ICD-10-CM

## 2023-03-03 DIAGNOSIS — M79.2 RADICULAR PAIN IN RIGHT ARM: ICD-10-CM

## 2023-03-03 PROCEDURE — 97110 THERAPEUTIC EXERCISES: CPT

## 2023-03-03 PROCEDURE — 97140 MANUAL THERAPY 1/> REGIONS: CPT

## 2023-03-03 NOTE — PROGRESS NOTES
Physical Therapy Treatment Note  [address] [place] [address]    Patient: Pallavi Singh                                                 Visit Date: 3/3/2023  :     1980    Referring practitioner:    Florentino Medina DO  Date of Initial Visit:          Type: THERAPY  Noted: 2023    Patient seen for 4 sessions    Visit Diagnoses:    ICD-10-CM ICD-9-CM   1. Radiculopathy, cervical  M54.12 723.4   2. Radicular pain in right arm  M79.2 729.2     SUBJECTIVE     Subjective She just woke up about ten minutes ago so her pain isn't terrible, but is creeping up slowly as we speak. She has a MedCline pillow which allows for better sleep ergonomics and does help if she's able to stay on it. She has pain medicine, though feels it really only helps her sleep.     PAIN: 5/10 > 3/10     OBJECTIVE     Objective   Manual Therapy     65092  Comments   STM with massage cream to B UT/LS and suboccipitals prone   Prone CT mobilizations    cervical manual distraction Supine with towel roll vertically between scapulae   Cervical traction + lateral flexion Supine with towel roll vertically between scapulae   IASTM with pink foam roller, prone Thoracic spine   Thoracic ext mobilizations, prone sustained           Timed Minutes 25     Therapeutic Exercises    49099 Units Comments   Attempted B UE phasic with YTB   Did not feel proper mms activate   Scapular squeezes in sitting 2 x 10     Seated rows with YTB 2 x 10 Reports it made more sense than the squeezes   Ball presses with 45 cm physioball  2 x 10     B horizontal abd with YTB 2 x 10  towel roll positioned vertically between scapulae   B cervical lateral rotation passively          Timed Minutes 20     Therapy Education/Self Care 91163   Education offered today    Neeru Code [email or web address]   Ongoing HEP   Date: 2023  Prepared by: Daria Carlos    Exercises  Supine Cervical Retraction with Towel - 2 x  daily - 7 x weekly - 2-3 minutes hold  Seated Passive Cervical Retraction - 2 x daily - 7 x weekly - 3 sets - 10 reps  Shoulder External Rotation and Scapular Retraction - 2 x daily - 7 x weekly - 3 sets - 10 reps  Seated Cervical Rotation AROM - 2 x daily - 7 x weekly - 3 sets - 10 reps  Seated Cervical Sidebending AROM - 2 x daily - 7 x weekly - 3 sets - 10 reps   Timed Minutes      Total Timed Treatment:    45   mins  Total Time of Visit:            45   mins    ASSESSMENT/PLAN     GOALS:  Goals                                           Progress Note due by 3/14/23                                                             Recert Due: 5/14/23    STG by: 3 weeks Comments Date Status   Improve mobility throughout thoracic spine and CT junction.     ongoing   Decreased muscle guarding  throughout neck and shoulder girdle. Moderate guarding throughout 3/3 ongoing   Demonstrate improved sitting posture with minimal cues needed.    ongoing   Understands appropriate stretches and position changes to reduce symptoms.      ongoing   LTG by: 6 weeks        Reports no radicular symptoms for a week  still with radicular symptoms consistently in right UE 2/17 ongoing   Report no neck/shoulder pain except occasional minor twinges no more than 2-3/10     ongoing   Understands improved ergonomics for work and HEP for flexibility and posture     ongoing   Improve score on Quick DASH by at least 12.     ongoing     Assessment/Plan     ASSESSMENT: She responded well to interventions last session, though did report they were short-lived. Introduced light postural strengthening today, which was a challenge for her at times. She would benefit from continued emphasis on mobility and progress strengthening as tolerated.     PLAN: continue emphasis on manual therapy, especially spinal mobility. May assess forearm for any kind of entrapment but focus on neck - consider Scratch-Collapse testing. Progress with postural strengthening and  endurance training as tolerated.       SIGNATURE: Sharee Meza PTA, [place] License #: [id number]  Electronically Signed on 3/3/2023        [address] [place] [address]  [phone]

## 2023-03-03 NOTE — PROGRESS NOTES
"Chief Complaint  Follow-up    Subjective        Pallavi Singh presents to [facility]  History of Present Illness  Mild relief for a few hrs with OMT for arm pain  MRI earlier this month:  1. A large broad-based central and predominantly right paracentral disc  herniation at C6-7 as detailed above. There is severe right neural  foraminal stenosis.  2. A moderate size broad-based central and left paracentral disc bulging  at C5-6 is moderate spinal stenosis. Neural foramina are patent.    Feels like strattera needs increased, pain not well controlled with gabapentin, makes her drowsy    Objective   Vital Signs:  /80 (BP Location: Left arm, Patient Position: Sitting, Cuff Size: Large Adult)   Pulse 99   Temp 96.7 °F (35.9 °C) (Temporal)   Ht 165.1 cm (65\")   Wt (!) 142 kg (314 lb)   SpO2 98%   BMI 52.25 kg/m²   Estimated body mass index is 52.25 kg/m² as calculated from the following:    Height as of this encounter: 165.1 cm (65\").    Weight as of this encounter: 142 kg (314 lb).             Physical Exam  Vitals and nursing note reviewed.   Constitutional:       General: She is not in acute distress.     Appearance: She is not diaphoretic.   HENT:      Head: Normocephalic and atraumatic.      Nose: Nose normal.   Eyes:      General: No scleral icterus.        Right eye: No discharge.         Left eye: No discharge.      Conjunctiva/sclera: Conjunctivae normal.   Neck:      Trachea: No tracheal deviation.   Pulmonary:      Effort: Pulmonary effort is normal.   Skin:     General: Skin is warm and dry.      Coloration: Skin is not pale.   Neurological:      Mental Status: She is alert and oriented to person, place, and time.   Psychiatric:         Behavior: Behavior normal.         Thought Content: Thought content normal.         Judgment: Judgment normal.        Result Review :                   Assessment and Plan   Diagnoses and all orders for this visit:    1. Attention " deficit hyperactivity disorder (ADHD), unspecified ADHD type (Primary)  -     atomoxetine (Strattera) 40 MG capsule; Take 1 capsule by mouth Daily.  Dispense: 30 capsule; Refill: 2    2. Herniated nucleus pulposus, C6-7 right  -     Ambulatory Referral to Neurosurgery  -     traMADol (ULTRAM) 50 MG tablet; Take 1 tablet by mouth Every 8 (Eight) Hours As Needed for Severe Pain.  Dispense: 60 tablet; Refill: 0    Other orders  -     Discontinue: atomoxetine (STRATTERA) 25 MG capsule; Take 1 capsule by mouth Daily.  Dispense: 30 capsule; Refill: 2      Increase strattera  Tramadol trial  Continue with pain management, surgical referral above  F/u 3 months

## 2023-03-08 ENCOUNTER — TREATMENT (OUTPATIENT)
Dept: PHYSICAL THERAPY | Facility: CLINIC | Age: 43
End: 2023-03-08
Payer: MEDICAID

## 2023-03-08 DIAGNOSIS — M54.12 RADICULOPATHY, CERVICAL: Primary | ICD-10-CM

## 2023-03-08 DIAGNOSIS — M79.2 RADICULAR PAIN IN RIGHT ARM: ICD-10-CM

## 2023-03-08 PROCEDURE — 97140 MANUAL THERAPY 1/> REGIONS: CPT | Performed by: PHYSICAL THERAPIST

## 2023-03-08 PROCEDURE — 97014 ELECTRIC STIMULATION THERAPY: CPT | Performed by: PHYSICAL THERAPIST

## 2023-03-08 NOTE — PROGRESS NOTES
Physical Therapy Treatment Note  [address] [place] [address]    Patient: Pallavi Singh                                                 Visit Date: 3/8/2023  :     1980    Referring practitioner:    Florentino Medina DO  Date of Initial Visit:          Type: THERAPY  Noted: 2023    Patient seen for 5 sessions    Visit Diagnoses:    ICD-10-CM ICD-9-CM   1. Radiculopathy, cervical  M54.12 723.4   2. Radicular pain in right arm  M79.2 729.2     SUBJECTIVE     Subjective She reports sleeping has been really bad lately the medicine helps with sleep to some point but not necessarily pain    PAIN: 9/10         OBJECTIVE     Objective      Manual Therapy     42366  Comments   Cervical suboccipital releases, manual traction, and traction with B lateral bends Grade 2    Scratch Collapse B UE all regions                Timed Minutes 25        Modalities Comments   premodulated quadripolar B UT and cervical region 8 mA with MH        Minutes 15       Therapy Education/Self Care 41583   Education offered today MRI, insurance auth and neuro consult    [facility] Code [id number]   Ongoing HEP   Date: 2023  Prepared by: Daria Carlos     Exercises  Supine Cervical Retraction with Towel - 2 x daily - 7 x weekly - 2-3 minutes hold  Seated Passive Cervical Retraction - 2 x daily - 7 x weekly - 3 sets - 10 reps  Shoulder External Rotation and Scapular Retraction - 2 x daily - 7 x weekly - 3 sets - 10 reps  Seated Cervical Rotation AROM - 2 x daily - 7 x weekly - 3 sets - 10 reps  Seated Cervical Sidebending AROM - 2 x daily - 7 x weekly - 3 sets - 10 reps   Timed Minutes        Total Timed Treatment:     25   mins  Total Time of Visit:             40   mins         ASSESSMENT/PLAN     GOALS  Goals                                           Progress Note due by 3/14/23                                                             Recert Due: 23      STG by: 3 weeks Comments Date Status   Improve mobility throughout thoracic spine and CT junction.     ongoing   Decreased muscle guarding  throughout neck and shoulder girdle. Moderate guarding throughout 3/3 ongoing   Demonstrate improved sitting posture with minimal cues needed.     ongoing   Understands appropriate stretches and position changes to reduce symptoms.      ongoing   LTG by: 6 weeks         Reports no radicular symptoms for a week  still with radicular symptoms consistently in right UE 2/17 ongoing   Report no neck/shoulder pain except occasional minor twinges no more than 2-3/10     ongoing   Understands improved ergonomics for work and HEP for flexibility and posture     ongoing   Improve score on Quick DASH by at least 12.     ongoing         Assessment/Plan     ASSESSMENT: She reported it's very challenging for her to find a position of comfort and while we did have to reposition her a couple of times she did well. We briefly reviewed her MRI again and I advised her that at some point we will likely take a prehab approach but for now we will focus on pain modulation.    PLAN: See assessment comments.    SIGNATURE: Juan Montana PTA, [place] License #: [id number]  Electronically Signed on 3/8/2023        [facility] [place] [address]  [phone]

## 2023-03-10 ENCOUNTER — TREATMENT (OUTPATIENT)
Dept: PHYSICAL THERAPY | Facility: CLINIC | Age: 43
End: 2023-03-10
Payer: MEDICAID

## 2023-03-10 DIAGNOSIS — M79.2 RADICULAR PAIN IN RIGHT ARM: ICD-10-CM

## 2023-03-10 DIAGNOSIS — M54.12 RADICULOPATHY, CERVICAL: Primary | ICD-10-CM

## 2023-03-10 PROCEDURE — 97110 THERAPEUTIC EXERCISES: CPT | Performed by: PHYSICAL THERAPIST

## 2023-03-10 PROCEDURE — 97140 MANUAL THERAPY 1/> REGIONS: CPT | Performed by: PHYSICAL THERAPIST

## 2023-03-10 NOTE — PROGRESS NOTES
"                                                                Physical Therapy Treatment Note  [address] [place] [address]    Patient: Pallavi Singh                                                 Visit Date: 3/10/2023  :     1980    Referring practitioner:    Florentino Medina DO  Date of Initial Visit:          Type: THERAPY  Noted: 2023    Patient seen for 6 sessions    Visit Diagnoses:    ICD-10-CM ICD-9-CM   1. Radiculopathy, cervical  M54.12 723.4   2. Radicular pain in right arm  M79.2 729.2     SUBJECTIVE     Subjective She reports the pool noodle has maybe helped some. She felt good after the massage last time with Jemima.     PAIN: 9/10         OBJECTIVE     Objective      Manual Therapy     31344  Comments   Prone- STM along B upper traps and R periscapular muscles    CT extension mobs    IASTM to R pronator teres with small pink stone Multiple areas of abnormal tissue- ridges and bumps. Some quite tender.            Timed Minutes 25        Therapeutic Exercises    06209 Units Comments   Worked on activation of middle traps single arm     kinesiotape- wide \"X\" bilateral scapular retraction                     Timed Minutes 20       Therapy Education/Self Care [id number]   Education offered today    [facility] Code [id number]   Ongoing HEP   Date: 2023  Prepared by: Daria Carlos     Exercises  Supine Cervical Retraction with Towel - 2 x daily - 7 x weekly - 2-3 minutes hold  Seated Passive Cervical Retraction - 2 x daily - 7 x weekly - 3 sets - 10 reps  Shoulder External Rotation and Scapular Retraction - 2 x daily - 7 x weekly - 3 sets - 10 reps  Seated Cervical Rotation AROM - 2 x daily - 7 x weekly - 3 sets - 10 reps  Seated Cervical Sidebending AROM - 2 x daily - 7 x weekly - 3 sets - 10 reps   Timed Minutes        Total Timed Treatment:     45   mins  Total Time of Visit:             45   mins         ASSESSMENT/PLAN     GOALS  Goals " "                                          Progress Note due by 3/14/23                                                             Recert Due: 5/14/23     STG by: 3 weeks Comments Date Status   Improve mobility throughout thoracic spine and CT junction.     ongoing   Decreased muscle guarding  throughout neck and shoulder girdle. Moderate guarding throughout 3/3 ongoing   Demonstrate improved sitting posture with minimal cues needed.     ongoing   Understands appropriate stretches and position changes to reduce symptoms.      ongoing   LTG by: 6 weeks         Reports no radicular symptoms for a week  still with radicular symptoms consistently in right UE 2/17 ongoing   Report no neck/shoulder pain except occasional minor twinges no more than 2-3/10     ongoing   Understands improved ergonomics for work and HEP for flexibility and posture     ongoing   Improve score on Quick DASH by at least 12.     ongoing         Assessment/Plan     ASSESSMENT: She is really trying to work on her posture and pay attention better. Her symptoms are quite consistent and she doesn't get any relief from the \"crushing\" feeling in her R arm. Since her presentation does correlate with the MRI results, I doubt conservative treatment is going to be much benefit.     PLAN: Cont to work on posture and soft tissue restrictions. She is going to contact her insurance about approval for neurosurgeon consult.     SIGNATURE: Daria Carlos, PT, DPT, [facility] [employer] License #: [id number]  Electronically Signed on 3/10/2023        [place]. [id number]  [phone]  "

## 2023-03-14 ENCOUNTER — HOSPITAL ENCOUNTER (OUTPATIENT)
Dept: PAIN MANAGEMENT | Age: 43
Discharge: HOME | End: 2023-03-14
Payer: MEDICAID

## 2023-03-14 ENCOUNTER — TREATMENT (OUTPATIENT)
Dept: PHYSICAL THERAPY | Facility: CLINIC | Age: 43
End: 2023-03-14
Payer: MEDICAID

## 2023-03-14 VITALS
RESPIRATION RATE: 18 BRPM | OXYGEN SATURATION: 94 % | BODY MASS INDEX: 48.82 KG/M2 | SYSTOLIC BLOOD PRESSURE: 156 MMHG | HEART RATE: 94 BPM | DIASTOLIC BLOOD PRESSURE: 109 MMHG | HEIGHT: 65 IN | TEMPERATURE: 97.3 F | WEIGHT: 293 LBS

## 2023-03-14 DIAGNOSIS — M79.2 RADICULAR PAIN IN RIGHT ARM: ICD-10-CM

## 2023-03-14 DIAGNOSIS — M54.12 RADICULOPATHY, CERVICAL: Primary | ICD-10-CM

## 2023-03-14 DIAGNOSIS — M62.830 MUSCLE SPASM OF BACK: ICD-10-CM

## 2023-03-14 DIAGNOSIS — M79.18 MYOFASCIAL MUSCLE PAIN: ICD-10-CM

## 2023-03-14 DIAGNOSIS — M54.12 CERVICAL RADICULOPATHY: ICD-10-CM

## 2023-03-14 PROCEDURE — 99215 OFFICE O/P EST HI 40 MIN: CPT

## 2023-03-14 PROCEDURE — 97140 MANUAL THERAPY 1/> REGIONS: CPT

## 2023-03-14 RX ORDER — ATOMOXETINE 40 MG/1
CAPSULE ORAL DAILY
COMMUNITY
Start: 2023-03-01

## 2023-03-14 RX ORDER — GABAPENTIN 300 MG/1
CAPSULE ORAL
COMMUNITY
Start: 2023-02-28

## 2023-03-14 ASSESSMENT — PAIN DESCRIPTION - ONSET: ONSET: ON-GOING

## 2023-03-14 ASSESSMENT — PAIN DESCRIPTION - DIRECTION: RADIATING_TOWARDS: INTO RIGHT ARM

## 2023-03-14 ASSESSMENT — PAIN DESCRIPTION - ORIENTATION: ORIENTATION: RIGHT

## 2023-03-14 ASSESSMENT — PAIN DESCRIPTION - FREQUENCY: FREQUENCY: CONTINUOUS

## 2023-03-14 ASSESSMENT — PAIN DESCRIPTION - LOCATION: LOCATION: ARM;NECK

## 2023-03-14 ASSESSMENT — PAIN - FUNCTIONAL ASSESSMENT: PAIN_FUNCTIONAL_ASSESSMENT: PREVENTS OR INTERFERES SOME ACTIVE ACTIVITIES AND ADLS

## 2023-03-14 ASSESSMENT — PAIN SCALES - GENERAL: PAINLEVEL_OUTOF10: 7

## 2023-03-14 ASSESSMENT — PAIN DESCRIPTION - PAIN TYPE: TYPE: CHRONIC PAIN
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
## 2023-03-14 NOTE — PROGRESS NOTES
Physical Therapy Treatment Note  [address] [place] [address]    Patient: Pallavi Singh                                                 Visit Date: 3/14/2023  :     1980    Referring practitioner:    Florentino Medina DO  Date of Initial Visit:          Type: THERAPY  Noted: 2023    Patient seen for 7 sessions    Visit Diagnoses:    ICD-10-CM ICD-9-CM   1. Radiculopathy, cervical  M54.12 723.4   2. Radicular pain in right arm  M79.2 729.2     SUBJECTIVE     Subjective She feels kind of the same. No long term response to last session. The pool noodle helps temporarily. Driving really increases her pain and she has done very minimal driving.     PAIN: 5.5/10     OBJECTIVE     Objective      Manual Therapy     42632  Comments   STM w/ massage cream to B UT/LS and suboccipitals prone   IASTM w/ blue ridged roller to thoracic    Prone CT mobilizations Reduced radicular symptoms   cervical manual distraction Supine over towel roll   Suboccipital release  Supine over towel roll    Applied sureprep and KT tape to R forearm Compression strip along pronator teres     Timed Minutes 40     Therapy Education/Self Care 32061   Education offered today    [facility] Code [id number]   Ongoing HEP   Date: 2023  Prepared by: Daria Carlos     Exercises  Supine Cervical Retraction with Towel - 2 x daily - 7 x weekly - 2-3 minutes hold  Seated Passive Cervical Retraction - 2 x daily - 7 x weekly - 3 sets - 10 reps  Shoulder External Rotation and Scapular Retraction - 2 x daily - 7 x weekly - 3 sets - 10 reps  Seated Cervical Rotation AROM - 2 x daily - 7 x weekly - 3 sets - 10 reps  Seated Cervical Sidebending AROM - 2 x daily - 7 x weekly - 3 sets - 10 reps   Timed Minutes        Total Timed Treatment:     40   mins  Total Time of Visit:             40   mins         ASSESSMENT/PLAN     GOALS  Goals                                           Progress  Note due by 3/14/23                                                             Recert Due: 5/14/23     STG by: 3 weeks Comments Date Status   Improve mobility throughout thoracic spine and CT junction.     ongoing   Decreased muscle guarding  throughout neck and shoulder girdle. Moderate guarding throughout 3/3 ongoing   Demonstrate improved sitting posture with minimal cues needed.     ongoing   Understands appropriate stretches and position changes to reduce symptoms.      ongoing   LTG by: 6 weeks         Reports no radicular symptoms for a week  still with radicular symptoms consistently in right UE 2/17 ongoing   Report no neck/shoulder pain except occasional minor twinges no more than 2-3/10     ongoing   Understands improved ergonomics for work and HEP for flexibility and posture     ongoing   Improve score on Quick DASH by at least 12.     ongoing     Assessment/Plan     ASSESSMENT: She has responded the best to the first treatment following her initial eval, therefore we focused primarily on reducing soft tissue restrictions and improving spinal mobility. She did report improvement of symptoms during and post session. KT tape was applied to pronator teres. She has contacted her doctor about a surgery consult and is now just waiting for it to be scheduled.     PLAN: Assess long term response to taping and continue to prioritize spinal alinement.    SIGNATURE: Mesha Ha PTA, [place] License #: [id number]  Electronically Signed on 3/14/2023        [place] [address]  [phone]

## 2023-03-14 NOTE — PROGRESS NOTES
Clinic Documentation      Education Provided:  [x] Review of Christie Marvin  [x] Agreement Review  [x] PEG Score Calculated [x] PHQ Score Calculated [x] ORT Score Calculated    [] Compliance Issues Discussed [] Cognitive Behavior Needs [x] Exercise [] Review of Test [] Financial Issues  [x] Tobacco/Alcohol Use Reviewed [x] Teaching [x] New Patient [x] Picture Obtained    Physician Plan:  [] Outgoing Referral  [] Pharmacy Consult  [] Test Ordered [] Prescription Ordered/Changed   [] Obtained Test Results / Consult Notes        Complete if patient is withholding blood thinner for procedure     Blood Thinner Patient is currently taking:      [] Plavix (Hold for 7 days)  [] Aspirin (Hold for 5 days)     [] Pletal (Hold for 2 days)  [] Pradaxa (Hold for 3 days)    [] Effient (Hold for 7 days)  [] Xarelto (Hold for 2 days)    [] Eliquis (Hold for 2 days)  [] Brilinta (Hold for 7 days)    [] Coumadin (Hold for 5 days) - (INR needs to be drawn the day prior to procedure- INR < 2.0)    [] Aggrenox (Hold for 7 days)        [] Patient will stop medication on their own.    [] Blood Thinner Form Faxed for approval to hold.    Provider form faxed to:     Assessment Completed by:  Electronically signed by Tita Morton RN on 3/14/2023 at 9:01 AM

## 2023-03-15 ENCOUNTER — TELEPHONE (OUTPATIENT)
Dept: NEUROSURGERY | Facility: CLINIC | Age: 43
End: 2023-03-15
Payer: MEDICAID

## 2023-03-15 NOTE — TELEPHONE ENCOUNTER
Pt called back and stated she is unable to come in that early. Informed pt she can come in at 11am. Pt is aware she needs to come in early to fill out NPPW. Ended call with pt understanding and acknowledgement.

## 2023-03-16 ENCOUNTER — TREATMENT (OUTPATIENT)
Dept: PHYSICAL THERAPY | Facility: CLINIC | Age: 43
End: 2023-03-16
Payer: MEDICAID

## 2023-03-16 DIAGNOSIS — M54.12 RADICULOPATHY, CERVICAL: Primary | ICD-10-CM

## 2023-03-16 DIAGNOSIS — M79.2 RADICULAR PAIN IN RIGHT ARM: ICD-10-CM

## 2023-03-16 PROCEDURE — 97110 THERAPEUTIC EXERCISES: CPT | Performed by: PHYSICAL THERAPIST

## 2023-03-16 PROCEDURE — 97535 SELF CARE MNGMENT TRAINING: CPT | Performed by: PHYSICAL THERAPIST

## 2023-03-16 PROCEDURE — 97140 MANUAL THERAPY 1/> REGIONS: CPT | Performed by: PHYSICAL THERAPIST

## 2023-03-16 NOTE — PROGRESS NOTES
Physical Therapy Treatment Note and 30 Day Progress Note  [address] [place] [address]    Patient: Pallavi Singh                                                 Visit Date: 3/16/2023  :     1980    Referring practitioner:    Florentino Medina DO  Date of Initial Visit:          Type: THERAPY  Noted: 2023    Patient seen for 8 sessions    Visit Diagnoses:    ICD-10-CM ICD-9-CM   1. Radiculopathy, cervical  M54.12 723.4   2. Radicular pain in right arm  M79.2 729.2     SUBJECTIVE     Subjective She feels about 5% since beginning PT. Reports she feels decent once leaving PT, though at most relief lasts for an hour. Her pain is lower right now, though she just woke up and knows it will increase as the day persists. After filling out QuickDASH, reports slight worsening in symptoms. Reports driving is the hardest activity she does that she has to do    PAIN: 4/10 > feels could move head easier     OBJECTIVE     Objective      Manual Therapy     31930  Comments   STM w/ massage cream to B UT/LS and suboccipitals Prone -- responded well to this, inc AROM lateral rotation following STM   Cervical ext and transverse mobilizations supine   cervical manual distraction Supine    Suboccipital release  Supine       Timed Minutes 20     Therapeutic Exercises    44032 Units Comments   B UE phasic against RTB  2 x 10     Standing rows against RTB  2 x 10     strength  L Trial 1: 58 lb and trial 2: 50 lbs  R Trial 1: 42 lbs and trial 2: 30 lbs   Key  strength  L trial 1: 15 lbs and trial 2: 14 lbs  R trial 1: 10 lbs and trial 2: 10 lbs   3 jaw andrés    L trial 1: 12 lbs and trial 2: 11 lbs  R trial 1: 6 lbs with pain, trial 2: 5 lbs with pain   Pincher    L trial 1: 11 lbs, trial 2: 6 lb, trial 3: 8 lbs  R trial 1-3: 4 deg        Timed Minutes 20     Therapy Education/Self Care [id number]   Education offered today HEP - see below, double  crush syndrome/carpal and cubital tunnel, POC - will prioritize cervical spine, nerve impingement, QuickDASH   [facility] Code [id number]   Ongoing HEP   Date: 03/16/2023  Prepared by: Shraee Meza    Supine Cervical Retraction with Towel - 2 x daily - 7 x weekly - 2-3 minutes hold  Seated Passive Cervical Retraction - 2 x daily - 7 x weekly - 3 sets - 10 reps  Seated Cervical Rotation AROM - 2 x daily - 7 x weekly - 3 sets - 10 reps  Seated Cervical Sidebending AROM - 2 x daily - 7 x weekly - 3 sets - 10 reps  Putty Squeezes - 1-2 x daily - 7 x weekly - 2 sets - 10 reps  Key Pinch with Putty - 1-2 x daily - 7 x weekly - 2 sets - 10 reps  3-Point Pinch with Putty - 1-2 x daily - 7 x weekly - 2 sets - 10 reps  Finger Extension with Putty - 1-2 x daily - 7 x weekly - 2 sets - 10 reps  Thumb Opposition with Putty - 1-2 x daily - 7 x weekly - 2 sets - 10 reps  B UE Phasic with Resistance - 1-2 x daily - 7 x weekly - 2 sets - 10 reps  Standing Shoulder Row with Anchored Resistance - 1-2 x daily - 7 x weekly - 2 sets - 10 reps   Timed Minutes 8     Total Timed Treatment:     48   mins  Total Time of Visit:             48   mins         ASSESSMENT/PLAN     GOALS  Goals                                           Progress Note due by 3/14/23                                                             Recert Due: 5/14/23     STG by: 3 weeks Comments Date Status   Improve mobility throughout thoracic spine and CT junction.     ongoing   Decreased muscle guarding  throughout neck and shoulder girdle. Moderate guarding throughout 3/3 ongoing   Demonstrate improved sitting posture with minimal cues needed. Min cues for posture. 3/16 ongoing   Understands appropriate stretches and position changes to reduce symptoms.  Has two positions which relieve symptoms 3/16 MET   LTG by: 6 weeks         Reports no radicular symptoms for a week unchanged 3/16 ongoing   Report no neck/shoulder pain except occasional minor twinges no more than  2-3/10 At worst, pain 8-9/10 and 7/10 on average. 3/16 ongoing   Understands improved ergonomics for work and HEP for flexibility and posture She reports compliance a couple times per day without issue.  3/16 ongoing   Improve score on Quick DASH by at least 12. 70.45 on 2/14/2023  79.55 on 3/16/2023 3/16 ongoing     Assessment/Plan     ASSESSMENT: Patient presents today reporting very little change and in fact, her QuickDASH worsened by 9 points. She reports N/T along R median nerve path and reports issues with dexterity and fine motor strength. Assessed her  strength and various pincher  strength and provided with TheraPutty and exercises to address decreasing strength. She would benefit from continued skilled PT to address her mobility deficits, postural endurance, and to continue monitoring her symptoms.     PLAN: Continue to address CT mobility and consider appropriate nerve glides. Continue to monitor symptoms and follow-up regarding neurosurgery referral. Bolster and progress HEP as appropriate.     SIGNATURE: Sharee Meza PTA, [place] License #: [id number]  Electronically Signed on 3/16/2023        [place] [address] [id number]  [phone]

## 2023-03-16 NOTE — PROGRESS NOTES
Progress Note Addendum      Patient: Pallavi Singh           : 1980  Visit Date: 3/16/2023  Referring practitioner: Florentino Medina DO  Date of Initial Visit: Type: THERAPY  Noted: 2023  Patient seen for 8 sessions  Visit Diagnoses:    ICD-10-CM ICD-9-CM   1. Radiculopathy, cervical  M54.12 723.4   2. Radicular pain in right arm  M79.2 729.2       PT G-Codes  Outcome Measure Options: Quick DASH  Quick DASH Score: 79.55  Clinical Progress: unchanged  Home Program Compliance: Yes  Progress toward previous goals: Not Met  Prognosis to achieve goals: fair    Objective     Assessment & Plan     Assessment  Impairments: abnormal or restricted ROM, lacks appropriate home exercise program and pain with function  Functional Limitations: carrying objects, lifting, sleeping, pulling, pushing, uncomfortable because of pain, reaching behind back and reaching overheadPrognosis: good    Plan  Therapy options: will be seen for skilled therapy services  Planned modality interventions: low level laser therapy, microcurrent electrical stimulation, TENS and dry needling  Planned therapy interventions: manual therapy, functional ROM exercises, home exercise program, joint mobilization, therapeutic activities, stretching, strengthening and soft tissue mobilization  Frequency: 2x week  Duration in weeks: 12  Treatment plan discussed with: patient      I reviewed the treatment and goals with Sharee Meza PTA and agree with the POC.      SIGNATURE: Av Norris PT, License #: [id number]  Electronically Signed on 3/16/2023

## 2023-03-17 ENCOUNTER — OFFICE VISIT (OUTPATIENT)
Dept: NEUROSURGERY | Facility: CLINIC | Age: 43
End: 2023-03-17
Payer: MEDICAID

## 2023-03-17 ENCOUNTER — TELEPHONE (OUTPATIENT)
Dept: NEUROSURGERY | Facility: CLINIC | Age: 43
End: 2023-03-17

## 2023-03-17 VITALS — HEIGHT: 65 IN | BODY MASS INDEX: 48.82 KG/M2 | WEIGHT: 293 LBS

## 2023-03-17 DIAGNOSIS — R29.898 RIGHT HAND WEAKNESS: ICD-10-CM

## 2023-03-17 DIAGNOSIS — E66.01 CLASS 3 SEVERE OBESITY DUE TO EXCESS CALORIES WITH SERIOUS COMORBIDITY AND BODY MASS INDEX (BMI) OF 50.0 TO 59.9 IN ADULT: Primary | ICD-10-CM

## 2023-03-17 DIAGNOSIS — Z78.9 NON-SMOKER: ICD-10-CM

## 2023-03-17 DIAGNOSIS — M50.30 DEGENERATION OF CERVICAL INTERVERTEBRAL DISC: ICD-10-CM

## 2023-03-17 DIAGNOSIS — M54.12 CERVICAL RADICULOPATHY: ICD-10-CM

## 2023-03-17 PROCEDURE — 1160F RVW MEDS BY RX/DR IN RCRD: CPT | Performed by: NURSE PRACTITIONER

## 2023-03-17 PROCEDURE — 1159F MED LIST DOCD IN RCRD: CPT | Performed by: NURSE PRACTITIONER

## 2023-03-17 PROCEDURE — 99213 OFFICE O/P EST LOW 20 MIN: CPT | Performed by: NURSE PRACTITIONER

## 2023-03-17 NOTE — PROGRESS NOTES
Chief complaint:   Chief Complaint   Patient presents with   • Neck Pain     Pt here for constant neck pain with numbness and tingling in bilateral arm and hands. Pt is currently in physical therapy [facility], currently pain mgmt. Seen chiropractor end of last year.       Subjective     HPI: This is a 42-year-old female patient who was referred to us by Dr. Florentino Medina for neck pain and right arm pain and numbness and tingling.  She is here to be evaluated today.  Patient says that she had some degree of intermittent neck pain that has occasionally bothered her but in August 2022 she started having significant right upper extremity pain.  Currently the pain in her neck is intermittent.  Nothing makes it better or worse.  She says the pain in her arm goes down into her shoulder blade and comes down into her right arm in a radicular fashion to her hand.  This pain in her arm is constant.  The right arm pain is worse with certain positions.  Resting makes it better.  She is also noticing that she is developing a tingling sensation in her left forearm but nothing to the degree of her right arm.  She is not complaining of any bowel or bladder incontinence.  She currently is in physical therapy at [facility] and has done 8 sessions of therapy.  She says that she does have improvement for about an hour after the therapy but then the pain does return like it was before the therapy.  She is right-hand dominant.  She has not worked since November.  She is single.  Denies any tobacco, alcohol, or illicit drug use.  She has had trial with steroids, muscle relaxers, gabapentin and anti-inflammatories without any improvement.    Review of Systems   Constitutional: Positive for activity change and fatigue.   HENT: Positive for ear pain.    Gastrointestinal: Positive for constipation.   Musculoskeletal: Positive for arthralgias, back pain, myalgias, neck pain and neck stiffness.   Neurological: Positive for dizziness,  "light-headedness and numbness.   Psychiatric/Behavioral: Positive for sleep disturbance.   All other systems reviewed and are negative.       Past Medical History:   Diagnosis Date   • Allergic rhinitis    • Chronic laryngitis    • Chronic rhinitis    • Chronic sinusitis    • Deviated nasal septum    • Globus sensation    • Hypothyroidism      Past Surgical History:   Procedure Laterality Date   • TONSILLECTOMY AND ADENOIDECTOMY     • TURBINOPLASTY  05/11/2015     Family History   Problem Relation Age of Onset   • Thyroid disease Mother    • Arthritis Mother    • Cancer Mother    • Heart disease Mother    • Obesity Mother    • Diabetes Father    • Hypertension Father    • Heart disease Father    • Cancer Paternal Grandfather      Social History     Tobacco Use   • Smoking status: Never   • Smokeless tobacco: Never   Vaping Use   • Vaping Use: Never used   Substance Use Topics   • Alcohol use: Yes     Comment: rare   • Drug use: Never     (Not in a hospital admission)    Allergies:  Bactrim [sulfamethoxazole-trimethoprim]    Objective      Vital Signs  Ht 165.1 cm (65\")   Wt (!) 142 kg (314 lb)   BMI 52.25 kg/m²     Physical Exam  Constitutional:       Appearance: Normal appearance. She is well-developed.   HENT:      Head: Normocephalic.   Eyes:      General: Lids are normal.      Extraocular Movements: EOM normal.      Conjunctiva/sclera: Conjunctivae normal.      Pupils: Pupils are equal, round, and reactive to light.   Pulmonary:      Effort: Pulmonary effort is normal.      Breath sounds: Normal breath sounds.   Musculoskeletal:         General: Normal range of motion.      Cervical back: Normal range of motion.   Skin:     General: Skin is warm.   Neurological:      Mental Status: She is alert and oriented to person, place, and time.      GCS: GCS eye subscore is 4. GCS verbal subscore is 5. GCS motor subscore is 6.      Cranial Nerves: No cranial nerve deficit.      Sensory: No sensory deficit.      Motor: " Motor strength is normal.      Gait: Gait is intact. Gait normal.      Deep Tendon Reflexes: Reflexes are normal and symmetric. Reflexes normal.   Psychiatric:         Speech: Speech normal.         Behavior: Behavior normal.         Thought Content: Thought content normal.         Female  strength (pounds)  AGE Right Hand RH Norms Left Hand LH Norms   20-24  70+14.5  61+13.1   25-29  75+13.9  63.5+12   30-34  79+19.2  68+17.7   35-39  74+10.8  66+11.7   40-44       45 lbs* 70+13.5       65 lbs 62+13.8   45-49  62+15.1  56+12.7   50-54  66+11.6  57+10.7   55-59  57+12.5  47+11.9   60-64  55+10.1  46+10.1   65-69  50+9.7  41+8.2   70-74  50+11.7  42+10.2   75+  43+11.0  38+8.9   (ISABELLA Hernandez et al; Hand Dynometer: Effects of trials and sessions.  Perpetual and Motor Skills 61:195-8, 1985)  * = Dominant hand  > = Intervention      Neurologic Exam     Mental Status   Oriented to person, place, and time.   Attention: normal. Concentration: normal.   Speech: speech is normal   Level of consciousness: alert  Normal comprehension.     Cranial Nerves     CN II   Visual fields full to confrontation.     CN III, IV, VI   Pupils are equal, round, and reactive to light.  Extraocular motions are normal.     CN V   Facial sensation intact.     CN VII   Facial expression full, symmetric.     CN VIII   CN VIII normal.     CN IX, X   CN IX normal.   CN X normal.     CN XI   CN XI normal.     CN XII   CN XII normal.     Motor Exam   Muscle bulk: normal    Strength   Strength 5/5 throughout.     Sensory Exam   Light touch normal.     Gait, Coordination, and Reflexes     Gait  Gait: normal    Reflexes   Reflexes 2+ except as noted.       Imaging review: MRI of the cervical spine that was done on February 9, 2023 shows a large disc herniation at C6-7 that is prominent off to the right causing significant foraminal narrowing.  At C5-6 there is a disc protrusion off to the left causing mild foraminal narrowing.  No fracture visualized.   No cord signal change.  There is loss of the normal cervical curvature    C6-7      C5-6        Assessment/Plan: The patient does have a significant disc herniation at C6-7 that I think is responsible for the majority of her symptoms however that she also does have a small disc protrusion at C5-6 that may be starting to cause her some left upper extremity symptoms.  She is going to finish out her physical therapy we will see her back in the office in 3 weeks for reevaluation and see if the patient would require cervical fusion.  Her questions and concerns were addressed.  Patient is a nonsmoker  The patient's Body mass index is 52.25 kg/m².. BMI is above normal parameters. Recommendations include: educational material and nutrition counseling    Diagnoses and all orders for this visit:    1. Class 3 severe obesity due to excess calories with serious comorbidity and body mass index (BMI) of 50.0 to 59.9 in adult (HCC) (Primary)    2. Degeneration of cervical intervertebral disc    3. Cervical radiculopathy    4. Right hand weakness    5. Non-smoker          I discussed the patients findings and my recommendations with patient    Ed Ngo, APRN  03/17/23  11:40 CDT

## 2023-03-17 NOTE — TELEPHONE ENCOUNTER
Caller: Pallavi Singh     Relationship: [unfilled]     Best call back number: [phone]    What is your medical concern?EPIDURAL VS TRIGGER POINT INJECTIONS      PT HAD CONSULT WITH [facility] AND THAT PROVIDER WAS CONCERNED ON TREATMENT PLAN DUE TO PT BEING REFERRED TO DR. BROWNING. PT WANTS TO KNOW IF SHE CAN GET AN EPIDURAL, IF NO CAN SHE DO TRIGGER POINT INJECTIONS.     PLEASE CALL TO ADVISE    THANK YOU,

## 2023-03-17 NOTE — PATIENT INSTRUCTIONS

## 2023-03-20 NOTE — TELEPHONE ENCOUNTER
PATIENT CALLED BACK AND WANTS TO KNOW IF WE HAVE AN ANSWER FOR HER.  IF POSSIBLE SHE WOULD LIKE A MESSAGE ON flo.do IF SHE DOES NOT ANSWER HER PHONE.  THANK YOU!

## 2023-03-20 NOTE — TELEPHONE ENCOUNTER
Ed sent the patient a My Chart message    [facility]  PHYSICIAN LEAD  DR JUSTUS BROWNING  [facility] NEUROSURGERY

## 2023-03-21 ENCOUNTER — TREATMENT (OUTPATIENT)
Dept: PHYSICAL THERAPY | Facility: CLINIC | Age: 43
End: 2023-03-21
Payer: MEDICAID

## 2023-03-21 DIAGNOSIS — M54.12 RADICULOPATHY, CERVICAL: Primary | ICD-10-CM

## 2023-03-21 DIAGNOSIS — M79.2 RADICULAR PAIN IN RIGHT ARM: ICD-10-CM

## 2023-03-21 PROCEDURE — 97110 THERAPEUTIC EXERCISES: CPT

## 2023-03-21 PROCEDURE — 97140 MANUAL THERAPY 1/> REGIONS: CPT

## 2023-03-21 NOTE — PROGRESS NOTES
Physical Therapy Treatment Note  [address] [place] [address]    Patient: Pallavi Singh                                                 Visit Date: 3/21/2023  :     1980    Referring practitioner:    Florentino Medina DO  Date of Initial Visit:          Type: THERAPY  Noted: 2023    Patient seen for 9 sessions    Visit Diagnoses:    ICD-10-CM ICD-9-CM   1. Radiculopathy, cervical  M54.12 723.4   2. Radicular pain in right arm  M79.2 729.2     SUBJECTIVE     Subjective She went for surgical consult, ([facility]) and is considered a candidate for surgery. She will meet with the surgeon on . She had a consult before with pain management. She notices when she     PAIN: 4/10 > feels could move head easier     OBJECTIVE     Objective      Manual Therapy     12930  Comments   STM w/ massage cream to B UT/LS and suboccipitals prone   IASTM w/ blue ridged roller to thoracic     Prone CT mobilizations Reduced radicular symptoms   cervical manual distraction Supine over towel roll   Suboccipital release  Supine over towel roll       Timed Minutes 24     Therapeutic Exercises    14111 Units Comments   Prone scapular retractions   2x10    Prone Is   2x10    Prone Ts   2x10              Timed Minutes 16     Therapy Education/Self Care [id number]   Education offered today Educated patient on various home traction devices    [facility] Code [id number]   Ongoing HEP   Date: 2023  Prepared by: Sharee Meza    Supine Cervical Retraction with Towel - 2 x daily - 7 x weekly - 2-3 minutes hold  Seated Passive Cervical Retraction - 2 x daily - 7 x weekly - 3 sets - 10 reps  Seated Cervical Rotation AROM - 2 x daily - 7 x weekly - 3 sets - 10 reps  Seated Cervical Sidebending AROM - 2 x daily - 7 x weekly - 3 sets - 10 reps  Putty Squeezes - 1-2 x daily - 7 x weekly - 2 sets - 10 reps  Key Pinch with Putty - 1-2 x daily - 7 x weekly - 2 sets - 10  reps  3-Point Pinch with Putty - 1-2 x daily - 7 x weekly - 2 sets - 10 reps  Finger Extension with Putty - 1-2 x daily - 7 x weekly - 2 sets - 10 reps  Thumb Opposition with Putty - 1-2 x daily - 7 x weekly - 2 sets - 10 reps  B UE Phasic with Resistance - 1-2 x daily - 7 x weekly - 2 sets - 10 reps  Standing Shoulder Row with Anchored Resistance - 1-2 x daily - 7 x weekly - 2 sets - 10 reps   Timed Minutes      Total Timed Treatment:     40   mins  Total Time of Visit:             40   mins         ASSESSMENT/PLAN     GOALS  Goals                                           Progress Note due by 4/15/23                                                             Recert Due: 5/14/23     STG by: 3 weeks Comments Date Status   Improve mobility throughout thoracic spine and CT junction.     ongoing   Decreased muscle guarding  throughout neck and shoulder girdle. Moderate guarding throughout 3/3 ongoing   Demonstrate improved sitting posture with minimal cues needed. Min cues for posture. 3/16 ongoing   Understands appropriate stretches and position changes to reduce symptoms.  Has two positions which relieve symptoms 3/16 MET   LTG by: 6 weeks         Reports no radicular symptoms for a week unchanged 3/16 ongoing   Report no neck/shoulder pain except occasional minor twinges no more than 2-3/10 At worst, pain 8-9/10 and 7/10 on average. 3/16 ongoing   Understands improved ergonomics for work and HEP for flexibility and posture She reports compliance a couple times per day without issue.  3/16 ongoing   Improve score on Quick DASH by at least 12. 70.45 on 2/14/2023  79.55 on 3/16/2023 3/16 ongoing     Assessment/Plan     ASSESSMENT: She did note that she does get some relief sitting in her comfy couch when she has arm support, therefore we progressed scapular strengthening. She responded well to prone exercises but did note her R UE fatigued much faster than her L. Continued to work on CT mobility and decreasing soft  tissue restrictions.     PLAN: Continue to address CT mobility. Bolster and progress HEP as appropriate.     SIGNATURE: Mesha Ha PTA, [place] License #: [id number]  Electronically Signed on 3/21/2023        [address] [place] [address]  [phone]

## 2023-03-23 ENCOUNTER — TREATMENT (OUTPATIENT)
Dept: PHYSICAL THERAPY | Facility: CLINIC | Age: 43
End: 2023-03-23
Payer: MEDICAID

## 2023-03-23 ENCOUNTER — TELEPHONE (OUTPATIENT)
Dept: NEUROSURGERY | Facility: CLINIC | Age: 43
End: 2023-03-23
Payer: MEDICAID

## 2023-03-23 DIAGNOSIS — M79.2 RADICULAR PAIN IN RIGHT ARM: ICD-10-CM

## 2023-03-23 DIAGNOSIS — M54.12 RADICULOPATHY, CERVICAL: Primary | ICD-10-CM

## 2023-03-23 PROCEDURE — 97140 MANUAL THERAPY 1/> REGIONS: CPT | Performed by: PHYSICAL THERAPIST

## 2023-03-23 NOTE — TELEPHONE ENCOUNTER
Called patient and told her that we are moving her appointment to see Dr. Bello for further evaluation and management.  She is on a cancellation list and will be notified when we have a sooner appointment

## 2023-03-23 NOTE — PROGRESS NOTES
Physical Therapy Treatment Note  [address] [place] [address]    Patient: Pallavi Singh                                                 Visit Date: 3/23/2023  :     1980    Referring practitioner:    Florentino Medina DO  Date of Initial Visit:          Type: THERAPY  Noted: 2023    Patient seen for 10 sessions    Visit Diagnoses:    ICD-10-CM ICD-9-CM   1. Radiculopathy, cervical  M54.12 723.4   2. Radicular pain in right arm  M79.2 729.2     SUBJECTIVE     Subjective She     PAIN: 6/10 > feels could move head easier     OBJECTIVE     Objective      Manual Therapy     90914  Comments   Prone thoracic ext Manual OP   Prone CT extension and rotation mobs Sustained manual OP   Prone alecia UT/LS STM/TrP release    Facet joint upglide mobs Gr 2-3 repetitive   Supine manual cervical traction     Supine subocc release    Timed Minutes 40     Therapy Education/Self Care [id number]   Education offered today Educated patient on various home traction devices    [employer] Code [id number]   Ongoing HEP   Date: 2023  Prepared by: Sharee Meza    Supine Cervical Retraction with Towel - 2 x daily - 7 x weekly - 2-3 minutes hold  Seated Passive Cervical Retraction - 2 x daily - 7 x weekly - 3 sets - 10 reps  Seated Cervical Rotation AROM - 2 x daily - 7 x weekly - 3 sets - 10 reps  Seated Cervical Sidebending AROM - 2 x daily - 7 x weekly - 3 sets - 10 reps  Putty Squeezes - 1-2 x daily - 7 x weekly - 2 sets - 10 reps  Key Pinch with Putty - 1-2 x daily - 7 x weekly - 2 sets - 10 reps  3-Point Pinch with Putty - 1-2 x daily - 7 x weekly - 2 sets - 10 reps  Finger Extension with Putty - 1-2 x daily - 7 x weekly - 2 sets - 10 reps  Thumb Opposition with Putty - 1-2 x daily - 7 x weekly - 2 sets - 10 reps  B UE Phasic with Resistance - 1-2 x daily - 7 x weekly - 2 sets - 10 reps  Standing Shoulder Row with Anchored Resistance - 1-2 x daily - 7 x weekly -  2 sets - 10 reps   Timed Minutes      Total Timed Treatment:     40   mins  Total Time of Visit:             40   mins         ASSESSMENT/PLAN     GOALS  Goals                                           Progress Note due by 4/15/23                                                             Recert Due: 5/14/23     STG by: 3 weeks Comments Date Status   Improve mobility throughout thoracic spine and CT junction.     ongoing   Decreased muscle guarding  throughout neck and shoulder girdle. Moderate guarding throughout 3/3 ongoing   Demonstrate improved sitting posture with minimal cues needed. Min cues for posture. 3/16 ongoing   Understands appropriate stretches and position changes to reduce symptoms.  Has two positions which relieve symptoms 3/16 MET   LTG by: 6 weeks         Reports no radicular symptoms for a week Unchanged; still having radicular symptoms 3/23 ongoing   Report no neck/shoulder pain except occasional minor twinges no more than 2-3/10 At worst, pain 8-9/10 and 7/10 on average. 3/16 ongoing   Understands improved ergonomics for work and HEP for flexibility and posture She reports compliance a couple times per day without issue.  3/16 ongoing   Improve score on Quick DASH by at least 12. 70.45 on 2/14/2023  79.55 on 3/16/2023 3/16 ongoing     Assessment/Plan     ASSESSMENT: She had a bad flare of neck and radicular pain. She is pursuing surgical options but her appointment with the neurosurgeon is in a couple of months. The APRN felt like she needed surgery.     PLAN: Continue to address CT mobility. Bolster and progress HEP as appropriate.     SIGNATURE: Av Norris, PT, [place] License #: [id number]  Electronically Signed on 3/23/2023        [address]  [place] [address]  [phone]

## 2023-03-28 ENCOUNTER — TELEPHONE (OUTPATIENT)
Dept: FAMILY MEDICINE CLINIC | Facility: CLINIC | Age: 43
End: 2023-03-28
Payer: MEDICAID

## 2023-03-28 ENCOUNTER — TELEPHONE (OUTPATIENT)
Dept: PHYSICAL THERAPY | Facility: CLINIC | Age: 43
End: 2023-03-28

## 2023-03-28 NOTE — TELEPHONE ENCOUNTER
Caller: Pallavi Singh    Relationship: Self    Best call back number: [phone]    What is the best time to reach you: ANYTIME    Who are you requesting to speak with (clinical staff, provider,  specific staff member): CLINICAL     What was the call regarding: [facility]INT IS EXPERIENCING SOME DOUBLE VISION AND BELIEVES IT MAY BE DUE TO THE GABAPENTIN, PATIENT HAS BEEN ON THIS MEDICATION FOR A COUPLE OF MONTHS     Do you require a callback: YES

## 2023-03-30 ENCOUNTER — TREATMENT (OUTPATIENT)
Dept: PHYSICAL THERAPY | Facility: CLINIC | Age: 43
End: 2023-03-30
Payer: MEDICAID

## 2023-03-30 DIAGNOSIS — M79.2 RADICULAR PAIN IN RIGHT ARM: ICD-10-CM

## 2023-03-30 DIAGNOSIS — M54.12 RADICULOPATHY, CERVICAL: Primary | ICD-10-CM

## 2023-03-30 PROCEDURE — 97535 SELF CARE MNGMENT TRAINING: CPT

## 2023-03-30 PROCEDURE — 97140 MANUAL THERAPY 1/> REGIONS: CPT

## 2023-03-30 NOTE — PROGRESS NOTES
Physical Therapy Treatment Note  [address] [place] [address]    Patient: Pallavi Singh                                                 Visit Date: 3/30/2023  :     1980    Referring practitioner:    Florentino Medina DO  Date of Initial Visit:          Type: THERAPY  Noted: 2023    Patient seen for 11 sessions    Visit Diagnoses:    ICD-10-CM ICD-9-CM   1. Radiculopathy, cervical  M54.12 723.4   2. Radicular pain in right arm  M79.2 729.2     SUBJECTIVE     Subjective She reports a friend of a friend is a PT and recommended a lidocaine patch, is interested in this. She reports Gabapentin results in blurred and double vision. She just woke up, reports pain WILL get worse as the day goes on.     PAIN: 6/10 > 4/10     OBJECTIVE     Objective      Manual Therapy     [id number]  Comments   CT extension and transverse mobilizations supine   STM with massage cream, prone B UT, LS, subocc, cervical paraspinals   Supine manual cervical traction     Supine subocc release    Timed Minutes 30     Therapy Education/Self Care [id number]   Education offered today Modified, prehab vs rehab, progression with PT, home traction unit, wedge for sleeping   [facility] Code [id number]   Ongoing HEP   Date: 2023  Prepared by: Sharee Meza    Seated Cervical Rotation AROM - 2 x daily - 7 x weekly - 3 sets - 10 reps  Seated Cervical Sidebending AROM - 2 x daily - 7 x weekly - 3 sets - 10 reps  Putty Squeezes - 1-2 x daily - 7 x weekly - 2 sets - 10 reps  Key Pinch with Putty - 1-2 x daily - 7 x weekly - 2 sets - 10 reps  3-Point Pinch with Putty - 1-2 x daily - 7 x weekly - 2 sets - 10 reps  Finger Extension with Putty - 1-2 x daily - 7 x weekly - 2 sets - 10 reps  Thumb Opposition with Putty - 1-2 x daily - 7 x weekly - 2 sets - 10 reps  B UE Phasic with Resistance - 1-2 x daily - 7 x weekly - 2 sets - 10 reps  Standing Shoulder Row with Anchored Resistance - 1-2  x daily - 7 x weekly - 2 sets - 10 reps   Timed Minutes 10     Total Timed Treatment:     40   mins  Total Time of Visit:             40   mins         ASSESSMENT/PLAN     GOALS  Goals                                           Progress Note due by 4/15/23                                                             Recert Due: 5/14/23     STG by: 3 weeks Comments Date Status   Improve mobility throughout thoracic spine and CT junction.     ongoing   Decreased muscle guarding  throughout neck and shoulder girdle. Moderate guarding throughout 3/3 ongoing   Demonstrate improved sitting posture with minimal cues needed. Min cues for posture. 3/16 ongoing   Understands appropriate stretches and position changes to reduce symptoms.  Has two positions which relieve symptoms 3/16 MET   LTG by: 6 weeks         Reports no radicular symptoms for a week Unchanged; still having radicular symptoms 3/23 ongoing   Report no neck/shoulder pain except occasional minor twinges no more than 2-3/10 At worst, pain 8-9/10 and 7/10 on average. 3/16 ongoing   Understands improved ergonomics for work and HEP for flexibility and posture Regular compliance reported, no issues 3/30 ongoing   Improve score on Quick DASH by at least 12. 70.45 on 2/14/2023  79.55 on 3/16/2023 3/16 ongoing     Assessment/Plan     ASSESSMENT: Patient reports feeling unchanged since beginning PT. She will gain relief for a few hours following her session, though pain always returns. She has an apt with surgeon coming soon to discuss potential ACDF, per pt. Due to lack of progress, placing her POC on hold at this time.     PLAN: Place POC on hold    SIGNATURE: Sharee Meza PTA, [place] License #: [id number]  Electronically Signed on 3/30/2023        Ryne Finney  [place] [address]  [phone]

## 2023-04-26 ENCOUNTER — HOSPITAL ENCOUNTER (OUTPATIENT)
Dept: GENERAL RADIOLOGY | Facility: HOSPITAL | Age: 43
Discharge: HOME | End: 2023-04-26
Payer: MEDICAID

## 2023-04-26 ENCOUNTER — PRE-ADMISSION TESTING (OUTPATIENT)
Dept: PREADMISSION TESTING | Facility: HOSPITAL | Age: 43
End: 2023-04-26
Payer: MEDICAID

## 2023-04-26 VITALS
SYSTOLIC BLOOD PRESSURE: 131 MMHG | WEIGHT: 293 LBS | RESPIRATION RATE: 16 BRPM | OXYGEN SATURATION: 98 % | HEIGHT: 65 IN | HEART RATE: 116 BPM | DIASTOLIC BLOOD PRESSURE: 93 MMHG | BODY MASS INDEX: 48.82 KG/M2

## 2023-04-26 LAB
ALBUMIN SERPL-MCNC: 4.5 G/DL (ref 3.5–5.2)
ALBUMIN/GLOB SERPL: 1.7 G/DL
ALP SERPL-CCNC: 87 U/L (ref 39–117)
ALT SERPL W P-5'-P-CCNC: 14 U/L (ref 1–33)
ANION GAP SERPL CALCULATED.3IONS-SCNC: 13 MMOL/L (ref 5–15)
APTT PPP: 30 SECONDS (ref 24.1–35)
AST SERPL-CCNC: 13 U/L (ref 1–32)
BACTERIA UR QL AUTO: ABNORMAL /HPF
BASOPHILS # BLD AUTO: 0.06 10*3/MM3 (ref 0–0.2)
BASOPHILS NFR BLD AUTO: 0.6 % (ref 0–1.5)
BILIRUB SERPL-MCNC: 0.3 MG/DL (ref 0–1.2)
BILIRUB UR QL STRIP: NEGATIVE
BUN SERPL-MCNC: 7 MG/DL (ref 6–20)
BUN/CREAT SERPL: 9.9 (ref 7–25)
CALCIUM SPEC-SCNC: 9.2 MG/DL (ref 8.6–10.5)
CHLORIDE SERPL-SCNC: 104 MMOL/L (ref 98–107)
CLARITY UR: ABNORMAL
CO2 SERPL-SCNC: 22 MMOL/L (ref 22–29)
COLOR UR: YELLOW
CREAT SERPL-MCNC: 0.71 MG/DL (ref 0.57–1)
DEPRECATED RDW RBC AUTO: 46 FL (ref 37–54)
EGFRCR SERPLBLD CKD-EPI 2021: 109.7 ML/MIN/1.73
EOSINOPHIL # BLD AUTO: 0.23 10*3/MM3 (ref 0–0.4)
EOSINOPHIL NFR BLD AUTO: 2.3 % (ref 0.3–6.2)
ERYTHROCYTE [DISTWIDTH] IN BLOOD BY AUTOMATED COUNT: 13.4 % (ref 12.3–15.4)
GLOBULIN UR ELPH-MCNC: 2.7 GM/DL
GLUCOSE SERPL-MCNC: 89 MG/DL (ref 65–99)
GLUCOSE UR STRIP-MCNC: NEGATIVE MG/DL
HCT VFR BLD AUTO: 45.5 % (ref 34–46.6)
HGB BLD-MCNC: 14.2 G/DL (ref 12–15.9)
HGB UR QL STRIP.AUTO: ABNORMAL
HYALINE CASTS UR QL AUTO: ABNORMAL /LPF
IMM GRANULOCYTES # BLD AUTO: 0.01 10*3/MM3 (ref 0–0.05)
IMM GRANULOCYTES NFR BLD AUTO: 0.1 % (ref 0–0.5)
INR PPP: 1.04 (ref 0.91–1.09)
KETONES UR QL STRIP: NEGATIVE
LEUKOCYTE ESTERASE UR QL STRIP.AUTO: ABNORMAL
LYMPHOCYTES # BLD AUTO: 2.47 10*3/MM3 (ref 0.7–3.1)
LYMPHOCYTES NFR BLD AUTO: 24.6 % (ref 19.6–45.3)
MCH RBC QN AUTO: 29 PG (ref 26.6–33)
MCHC RBC AUTO-ENTMCNC: 31.2 G/DL (ref 31.5–35.7)
MCV RBC AUTO: 93 FL (ref 79–97)
MONOCYTES # BLD AUTO: 0.61 10*3/MM3 (ref 0.1–0.9)
MONOCYTES NFR BLD AUTO: 6.1 % (ref 5–12)
NEUTROPHILS NFR BLD AUTO: 6.65 10*3/MM3 (ref 1.7–7)
NEUTROPHILS NFR BLD AUTO: 66.3 % (ref 42.7–76)
NITRITE UR QL STRIP: NEGATIVE
NRBC BLD AUTO-RTO: 0 /100 WBC (ref 0–0.2)
PH UR STRIP.AUTO: 5.5 [PH] (ref 5–8)
PLATELET # BLD AUTO: 448 10*3/MM3 (ref 140–450)
PMV BLD AUTO: 9.3 FL (ref 6–12)
POTASSIUM SERPL-SCNC: 4.4 MMOL/L (ref 3.5–5.2)
PROT SERPL-MCNC: 7.2 G/DL (ref 6–8.5)
PROT UR QL STRIP: NEGATIVE
PROTHROMBIN TIME: 13.7 SECONDS (ref 11.8–14.8)
RBC # BLD AUTO: 4.89 10*6/MM3 (ref 3.77–5.28)
RBC # UR STRIP: ABNORMAL /HPF
REF LAB TEST METHOD: ABNORMAL
SODIUM SERPL-SCNC: 139 MMOL/L (ref 136–145)
SP GR UR STRIP: 1.02 (ref 1–1.03)
SQUAMOUS #/AREA URNS HPF: ABNORMAL /HPF
UROBILINOGEN UR QL STRIP: ABNORMAL
WBC # UR STRIP: ABNORMAL /HPF
WBC NRBC COR # BLD: 10.03 10*3/MM3 (ref 3.4–10.8)

## 2023-04-26 PROCEDURE — 81001 URINALYSIS AUTO W/SCOPE: CPT

## 2023-04-26 PROCEDURE — 85025 COMPLETE CBC W/AUTO DIFF WBC: CPT

## 2023-04-26 PROCEDURE — 87086 URINE CULTURE/COLONY COUNT: CPT

## 2023-04-26 PROCEDURE — 71045 X-RAY EXAM CHEST 1 VIEW: CPT

## 2023-04-26 PROCEDURE — 80053 COMPREHEN METABOLIC PANEL: CPT

## 2023-04-26 PROCEDURE — 93005 ELECTROCARDIOGRAM TRACING: CPT

## 2023-04-26 PROCEDURE — 85730 THROMBOPLASTIN TIME PARTIAL: CPT

## 2023-04-26 PROCEDURE — 36415 COLL VENOUS BLD VENIPUNCTURE: CPT

## 2023-04-26 PROCEDURE — 93010 ELECTROCARDIOGRAM REPORT: CPT | Performed by: INTERNAL MEDICINE

## 2023-04-26 PROCEDURE — 85610 PROTHROMBIN TIME: CPT

## 2023-04-26 NOTE — DISCHARGE INSTRUCTIONS
Before you come to the hospital        Arrival time: AS DIRECTED BY OFFICE     YOU MAY TAKE THE FOLLOWING MEDICATION(S) THE MORNING OF SURGERY WITH A SIP OF WATER: PER MD ORDERS            ALL OTHER HOME MEDICATION CHECK WITH YOUR PHYSICIAN (especially if   you are taking diabetes medicines or blood thinners)    Do not take any Erectile Dysfunction medications (EX: CIALIS, VIAGRA) 24 hours prior to surgery.      If you were given and instructed to use a germ- killing soap, use as directed the night before surgery and again the morning of surgery or as directed by your surgeon. (Use one-half of the bottle with each shower.)   See attached information for How to Use Chlorhexidine for Bathing if applicable.            Eating and drinking restrictions prior to scheduled arrival time    2 Hours before arrival time STOP   Drinking Clear liquids (water, apple juice-no pulp)     6 Hours before arrival time STOP   Milk or drinks that contain milk, full liquids    6 Hours before arrival time STOP   Light meals or foods, such as toast or cereal    8 Hours before arrival time STOP   Heavy foods, such as meat, fried foods, or fatty foods    (It is extremely important that you follow these guidelines to prevent delay or cancelation of your procedure)     Clear Liquids  Water and flavored water                                                                      Clear Fruit juices, such as cranberry juice and apple juice.  Black coffee (NO cream of any kind, including powdered).  Plain tea  Clear bouillon or broth.  Flavored gelatin.  Soda.  Gatorade or Powerade.  Full liquid examples  Juices that have pulp.  Frozen ice pops that contain fruit pieces.  Coffee with creamer  Milk.  Yogurt.                MANAGING PAIN AFTER SURGERY    We know you are probably wondering what your pain will be like after surgery.  Following surgery it is unrealistic to expect you will not have pain.   Pain is how our bodies let us know that something  is wrong or cautions us to be careful.  That said, our goal is to make your pain tolerable.    Methods we may use to treat your pain include (oral or IV medications, PCAs, epidurals, nerve blocks, etc.)   While some procedures require IV pain medications for a short time after surgery, transitioning to pain medications by mouth allows for better management of pain.   Your nurse will encourage you to take oral pain medications whenever possible.  IV medications work almost immediately, but only last a short while.  Taking medications by mouth allows for a more constant level of medication in your blood stream for a longer period of time.      Once your pain is out of control it is harder to get back under control.  It is important you are aware when your next dose of pain medication is due.  If you are admitted, your nurse may write the time of your next dose on the white board in your room to help you remember.      We are interested in your pain and encourage you to inform us about aggravating factors during your visit.   Many times a simple repositioning every few hours can make a big difference.    If your physician says it is okay, do not let your pain prevent you from getting out of bed. Be sure to call your nurse for assistance prior to getting up so you do not fall.      Before surgery, please decide your tolerable pain goal.  These faces help describe the pain ratings we use on a 0-10 scale.   Be prepared to tell us your goal and whether or not you take pain or anxiety medications at home.          Preparing for Surgery  Preparing for surgery is an important part of your care. It can make things go more smoothly and help you avoid complications. The steps leading up to surgery may vary among hospitals. Follow all instructions given to you by your health care providers. Ask questions if you do not understand something. Talk about any concerns that you have.  Here are some questions to consider asking before  your surgery:  If my surgery is not an emergency (is elective), when would be the best time to have the surgery?  What arrangements do I need to make for work, home, or school?  What will my recovery be like? How long will it be before I can return to normal activities?  Will I need to prepare my home? Will I need to arrange care for me or my children?  Should I expect to have pain after surgery? What are my pain management options? Are there nonmedical options that I can try for pain?  Tell a health care provider about:  Any allergies you have.  All medicines you are taking, including vitamins, herbs, eye drops, creams, and over-the-counter medicines.  Any problems you or family members have had with anesthetic medicines.  Any blood disorders you have.  Any surgeries you have had.  Any medical conditions you have.  Whether you are pregnant or may be pregnant.  What are the risks?  The risks and complications of surgery depend on the specific procedure that you have. Discuss all the risks with your health care providers before your surgery. Ask about common surgical complications, which may include:  Infection.  Bleeding or a need for blood replacement (transfusion).  Allergic reactions to medicines.  Damage to surrounding nerves, tissues, or structures.  A blood clot.  Scarring.  Failure of the surgery to correct the problem.  Follow these instructions before the procedure:  Several days or weeks before your procedure  You may have a physical exam by your primary health care provider to make sure it is safe for you to have surgery.  You may have testing. This may include a chest X-ray, blood and urine tests, electrocardiogram (ECG), or other testing.  Ask your health care provider about:  Changing or stopping your regular medicines. This is especially important if you are taking diabetes medicines or blood thinners.  Taking medicines such as aspirin and ibuprofen. These medicines can thin your blood. Do not take  these medicines unless your health care provider tells you to take them.  Taking over-the-counter medicines, vitamins, herbs, and supplements.  Do not use any products that contain nicotine or tobacco, such as cigarettes and e-cigarettes. If you need help quitting, ask your health care provider.  Avoid alcohol.  Ask your health care provider if there are exercises you can do to prepare for surgery.  Eat a healthy diet.   Plan to have someone 18 years of age or older to take you home from the hospital. We will need to verify your ride on the morning of surgery if you are being discharged home on the same day. Tell your ride to be expecting a call from the hospital prior to your procedure.   Plan to have a responsible adult care for you for at least 24 hours after you leave the hospital or clinic. This is important.  The day before your procedure  You may be given antibiotic medicine to take by mouth to help prevent infection. Take it as told by your health care provider.  You may be asked to shower with a germ-killing soap.  Follow instructions from your health care provider about eating and drinking restrictions. This includes gum, mints and hard candy.  Pack comfortable clothes according to your procedure.   The day of your procedure  You may need to take another shower with a germ-killing soap before you leave home in the morning.  With a small sip of water, take only the medicines that you are told to take.  Remove all jewelry including rings.   Leave anything you consider valuable at home except hearing aids if needed.  You do not need to bring your home medications into the hospital.   Do not wear any makeup, nail polish, powder, deodorant, lotion, hair accessories, or anything on your skin or body except your clothes.  If you will be staying in the hospital, bring a case to hold your glasses, contacts, or dentures. You may also want to bring your robe and non-skid footwear.       (Do not use denture adhesives  since you will be asked to remove them during  surgery).   If you wear oxygen at home, bring it with you the day of surgery.  If instructed by your health care provider, bring your sleep apnea device with you on the day of your surgery (if this applies to you).  You may want to leave your suitcase and sleep apnea device in the car until after surgery.   Arrive at the hospital as scheduled.  Bring a friend or family member with you who can help to answer questions and be present while you meet with your health care provider.  At the hospital  When you arrive at the hospital:  Go to registration located at the main entrance of the hospital. You will be registered and given a beeper and a sticker sheet. Take the stickers to the Outpatient nurses desk and place in the black tray. This is to notify staff that you have arrived. Then return to the lobby to wait.   When your beeper lights up and vibrates proceed through the double doors, under the stairs, and a member of the Outpatient Surgery staff will escort you to your preoperative room.  You may have to wear compression sleeves. These help to prevent blood clots and reduce swelling in your legs.  An IV may be inserted into one of your veins.              In the operating room, you may be given one or more of the following:        A medicine to help you relax (sedative).        A medicine to numb the area (local anesthetic).        A medicine to make you fall asleep (general anesthetic).        A medicine that is injected into an area of your body to numb everything below the                      injection site (regional anesthetic).  You may be given an antibiotic through your IV to help prevent infection.  Your surgical site will be marked or identified.    Contact a health care provider if you:  Develop a fever of more than 100.4°F (38°C) or other feelings of illness during the 48 hours before your surgery.  Have symptoms that get worse.  Have questions or concerns  about your surgery.  Summary  Preparing for surgery can make the procedure go more smoothly and lower your risk of complications.  Before surgery, make a list of questions and concerns to discuss with your surgeon. Ask about the risks and possible complications.  In the days or weeks before your surgery, follow all instructions from your health care provider. You may need to stop smoking, avoid alcohol, follow eating restrictions, and change or stop your regular medicines.  Contact your surgeon if you develop a fever or other signs of illness during the few days before your surgery.  This information is not intended to replace advice given to you by your health care provider. Make sure you discuss any questions you have with your health care provider.  Document Revised: 12/21/2018 Document Reviewed: 10/23/2018  Elsevier Patient Education © 2021 [employer]

## 2023-04-27 LAB — BACTERIA SPEC AEROBE CULT: NORMAL

## 2023-04-28 LAB
QT INTERVAL: 332 MS
QTC INTERVAL: 432 MS

## 2023-05-10 ENCOUNTER — ANESTHESIA (OUTPATIENT)
Dept: PERIOP | Facility: HOSPITAL | Age: 43
End: 2023-05-10
Payer: MEDICAID

## 2023-05-10 ENCOUNTER — ANESTHESIA EVENT (OUTPATIENT)
Dept: PERIOP | Facility: HOSPITAL | Age: 43
End: 2023-05-10
Payer: MEDICAID

## 2023-05-10 ENCOUNTER — HOSPITAL ENCOUNTER (OUTPATIENT)
Facility: HOSPITAL | Age: 43
Setting detail: HOSPITAL OUTPATIENT SURGERY
Discharge: HOME | End: 2023-05-10
Attending: ORTHOPAEDIC SURGERY | Admitting: ORTHOPAEDIC SURGERY
Payer: MEDICAID

## 2023-05-10 ENCOUNTER — APPOINTMENT (OUTPATIENT)
Dept: GENERAL RADIOLOGY | Facility: HOSPITAL | Age: 43
End: 2023-05-10
Payer: MEDICAID

## 2023-05-10 VITALS
HEART RATE: 81 BPM | RESPIRATION RATE: 16 BRPM | TEMPERATURE: 98.1 F | SYSTOLIC BLOOD PRESSURE: 119 MMHG | OXYGEN SATURATION: 94 % | DIASTOLIC BLOOD PRESSURE: 81 MMHG

## 2023-05-10 DIAGNOSIS — M54.12 CERVICAL RADICULOPATHY: Primary | ICD-10-CM

## 2023-05-10 LAB
ABO GROUP BLD: NORMAL
BLD GP AB SCN SERPL QL: NEGATIVE
HCG SERPL QL: NEGATIVE
RH BLD: POSITIVE
T&S EXPIRATION DATE: NORMAL

## 2023-05-10 PROCEDURE — 25010000002 PROPOFOL 10 MG/ML EMULSION: Performed by: NURSE ANESTHETIST, CERTIFIED REGISTERED

## 2023-05-10 PROCEDURE — C1713 ANCHOR/SCREW BN/BN,TIS/BN: HCPCS | Performed by: ORTHOPAEDIC SURGERY

## 2023-05-10 PROCEDURE — 86900 BLOOD TYPING SEROLOGIC ABO: CPT | Performed by: ORTHOPAEDIC SURGERY

## 2023-05-10 PROCEDURE — 25010000002 FENTANYL CITRATE (PF) 50 MCG/ML SOLUTION: Performed by: NURSE ANESTHETIST, CERTIFIED REGISTERED

## 2023-05-10 PROCEDURE — 76000 FLUOROSCOPY <1 HR PHYS/QHP: CPT

## 2023-05-10 PROCEDURE — 25010000002 FENTANYL CITRATE (PF) 250 MCG/5ML SOLUTION: Performed by: NURSE ANESTHETIST, CERTIFIED REGISTERED

## 2023-05-10 PROCEDURE — 25010000002 ONDANSETRON PER 1 MG: Performed by: NURSE ANESTHETIST, CERTIFIED REGISTERED

## 2023-05-10 PROCEDURE — L0120 CERV FLEX N/ADJ FOAM PRE OTS: HCPCS | Performed by: ORTHOPAEDIC SURGERY

## 2023-05-10 PROCEDURE — 86901 BLOOD TYPING SEROLOGIC RH(D): CPT | Performed by: ORTHOPAEDIC SURGERY

## 2023-05-10 PROCEDURE — 86850 RBC ANTIBODY SCREEN: CPT | Performed by: ORTHOPAEDIC SURGERY

## 2023-05-10 PROCEDURE — 72040 X-RAY EXAM NECK SPINE 2-3 VW: CPT

## 2023-05-10 PROCEDURE — 25010000002 CEFAZOLIN PER 500 MG: Performed by: ORTHOPAEDIC SURGERY

## 2023-05-10 PROCEDURE — 84703 CHORIONIC GONADOTROPIN ASSAY: CPT | Performed by: ORTHOPAEDIC SURGERY

## 2023-05-10 DEVICE — IMPLANTABLE DEVICE: Type: IMPLANTABLE DEVICE | Site: SPINE CERVICAL | Status: FUNCTIONAL

## 2023-05-10 DEVICE — KT HEMOST ABS SURGIFOAM PORCN 1GRAM: Type: IMPLANTABLE DEVICE | Site: SPINE CERVICAL | Status: FUNCTIONAL

## 2023-05-10 RX ORDER — SODIUM CHLORIDE 0.9 % (FLUSH) 0.9 %
10 SYRINGE (ML) INJECTION EVERY 12 HOURS SCHEDULED
Status: DISCONTINUED | OUTPATIENT
Start: 2023-05-10 | End: 2023-05-10 | Stop reason: HOSPADM

## 2023-05-10 RX ORDER — FLUMAZENIL 0.1 MG/ML
0.2 INJECTION INTRAVENOUS AS NEEDED
Status: DISCONTINUED | OUTPATIENT
Start: 2023-05-10 | End: 2023-05-10 | Stop reason: HOSPADM

## 2023-05-10 RX ORDER — LABETALOL HYDROCHLORIDE 5 MG/ML
5 INJECTION, SOLUTION INTRAVENOUS
Status: DISCONTINUED | OUTPATIENT
Start: 2023-05-10 | End: 2023-05-10 | Stop reason: HOSPADM

## 2023-05-10 RX ORDER — OXYCODONE AND ACETAMINOPHEN 10; 325 MG/1; MG/1
1 TABLET ORAL ONCE AS NEEDED
Status: COMPLETED | OUTPATIENT
Start: 2023-05-10 | End: 2023-05-10

## 2023-05-10 RX ORDER — GABAPENTIN 300 MG/1
300 CAPSULE ORAL 3 TIMES DAILY
COMMUNITY
End: 2024-02-02

## 2023-05-10 RX ORDER — SODIUM CHLORIDE 0.9 % (FLUSH) 0.9 %
10 SYRINGE (ML) INJECTION AS NEEDED
Status: DISCONTINUED | OUTPATIENT
Start: 2023-05-10 | End: 2023-05-10 | Stop reason: HOSPADM

## 2023-05-10 RX ORDER — OXYCODONE AND ACETAMINOPHEN 7.5; 325 MG/1; MG/1
2 TABLET ORAL EVERY 4 HOURS PRN
Status: DISCONTINUED | OUTPATIENT
Start: 2023-05-10 | End: 2023-05-10 | Stop reason: HOSPADM

## 2023-05-10 RX ORDER — SODIUM CHLORIDE 9 MG/ML
INJECTION, SOLUTION INTRAVENOUS AS NEEDED
Status: DISCONTINUED | OUTPATIENT
Start: 2023-05-10 | End: 2023-05-10 | Stop reason: HOSPADM

## 2023-05-10 RX ORDER — FENTANYL CITRATE 50 UG/ML
25 INJECTION, SOLUTION INTRAMUSCULAR; INTRAVENOUS
Status: DISCONTINUED | OUTPATIENT
Start: 2023-05-10 | End: 2023-05-10 | Stop reason: HOSPADM

## 2023-05-10 RX ORDER — ONDANSETRON 2 MG/ML
4 INJECTION INTRAMUSCULAR; INTRAVENOUS ONCE AS NEEDED
Status: DISCONTINUED | OUTPATIENT
Start: 2023-05-10 | End: 2023-05-10 | Stop reason: HOSPADM

## 2023-05-10 RX ORDER — LIDOCAINE HYDROCHLORIDE 20 MG/ML
INJECTION, SOLUTION EPIDURAL; INFILTRATION; INTRACAUDAL; PERINEURAL AS NEEDED
Status: DISCONTINUED | OUTPATIENT
Start: 2023-05-10 | End: 2023-05-10 | Stop reason: SURG

## 2023-05-10 RX ORDER — SODIUM CHLORIDE 9 MG/ML
40 INJECTION, SOLUTION INTRAVENOUS AS NEEDED
Status: DISCONTINUED | OUTPATIENT
Start: 2023-05-10 | End: 2023-05-10 | Stop reason: HOSPADM

## 2023-05-10 RX ORDER — MAGNESIUM HYDROXIDE 1200 MG/15ML
LIQUID ORAL AS NEEDED
Status: DISCONTINUED | OUTPATIENT
Start: 2023-05-10 | End: 2023-05-10 | Stop reason: HOSPADM

## 2023-05-10 RX ORDER — SUCCINYLCHOLINE/SOD CL,ISO/PF 200MG/10ML
SYRINGE (ML) INTRAVENOUS AS NEEDED
Status: DISCONTINUED | OUTPATIENT
Start: 2023-05-10 | End: 2023-05-10 | Stop reason: SURG

## 2023-05-10 RX ORDER — IBUPROFEN 600 MG/1
600 TABLET ORAL ONCE AS NEEDED
Status: DISCONTINUED | OUTPATIENT
Start: 2023-05-10 | End: 2023-05-10 | Stop reason: HOSPADM

## 2023-05-10 RX ORDER — OXYCODONE AND ACETAMINOPHEN 10; 325 MG/1; MG/1
1 TABLET ORAL EVERY 4 HOURS PRN
Qty: 96 TABLET | Refills: 0 | Status: SHIPPED | OUTPATIENT
Start: 2023-05-10 | End: 2024-02-02

## 2023-05-10 RX ORDER — DROPERIDOL 2.5 MG/ML
0.62 INJECTION, SOLUTION INTRAMUSCULAR; INTRAVENOUS ONCE AS NEEDED
Status: DISCONTINUED | OUTPATIENT
Start: 2023-05-10 | End: 2023-05-10 | Stop reason: HOSPADM

## 2023-05-10 RX ORDER — LIDOCAINE HYDROCHLORIDE 10 MG/ML
0.5 INJECTION, SOLUTION EPIDURAL; INFILTRATION; INTRACAUDAL; PERINEURAL ONCE AS NEEDED
Status: DISCONTINUED | OUTPATIENT
Start: 2023-05-10 | End: 2023-05-10 | Stop reason: HOSPADM

## 2023-05-10 RX ORDER — OXYCODONE HCL 20 MG/1
20 TABLET, FILM COATED, EXTENDED RELEASE ORAL ONCE
Status: COMPLETED | OUTPATIENT
Start: 2023-05-10 | End: 2023-05-10

## 2023-05-10 RX ORDER — NALOXONE HCL 0.4 MG/ML
0.4 VIAL (ML) INJECTION AS NEEDED
Status: DISCONTINUED | OUTPATIENT
Start: 2023-05-10 | End: 2023-05-10 | Stop reason: HOSPADM

## 2023-05-10 RX ORDER — CEFAZOLIN SODIUM IN 0.9 % NACL 3 G/100 ML
3 INTRAVENOUS SOLUTION, PIGGYBACK (ML) INTRAVENOUS ONCE
Status: COMPLETED | OUTPATIENT
Start: 2023-05-10 | End: 2023-05-10

## 2023-05-10 RX ORDER — PROPOFOL 10 MG/ML
VIAL (ML) INTRAVENOUS AS NEEDED
Status: DISCONTINUED | OUTPATIENT
Start: 2023-05-10 | End: 2023-05-10 | Stop reason: SURG

## 2023-05-10 RX ORDER — ONDANSETRON 2 MG/ML
INJECTION INTRAMUSCULAR; INTRAVENOUS AS NEEDED
Status: DISCONTINUED | OUTPATIENT
Start: 2023-05-10 | End: 2023-05-10 | Stop reason: SURG

## 2023-05-10 RX ORDER — LABETALOL HYDROCHLORIDE 5 MG/ML
INJECTION, SOLUTION INTRAVENOUS AS NEEDED
Status: DISCONTINUED | OUTPATIENT
Start: 2023-05-10 | End: 2023-05-10 | Stop reason: SURG

## 2023-05-10 RX ORDER — SODIUM CHLORIDE, SODIUM LACTATE, POTASSIUM CHLORIDE, CALCIUM CHLORIDE 600; 310; 30; 20 MG/100ML; MG/100ML; MG/100ML; MG/100ML
1000 INJECTION, SOLUTION INTRAVENOUS CONTINUOUS
Status: DISCONTINUED | OUTPATIENT
Start: 2023-05-10 | End: 2023-05-10 | Stop reason: HOSPADM

## 2023-05-10 RX ORDER — ROCURONIUM BROMIDE 10 MG/ML
INJECTION, SOLUTION INTRAVENOUS AS NEEDED
Status: DISCONTINUED | OUTPATIENT
Start: 2023-05-10 | End: 2023-05-10 | Stop reason: SURG

## 2023-05-10 RX ORDER — SODIUM CHLORIDE, SODIUM LACTATE, POTASSIUM CHLORIDE, CALCIUM CHLORIDE 600; 310; 30; 20 MG/100ML; MG/100ML; MG/100ML; MG/100ML
100 INJECTION, SOLUTION INTRAVENOUS CONTINUOUS PRN
Status: DISCONTINUED | OUTPATIENT
Start: 2023-05-10 | End: 2023-05-10 | Stop reason: HOSPADM

## 2023-05-10 RX ORDER — FENTANYL CITRATE 50 UG/ML
INJECTION, SOLUTION INTRAMUSCULAR; INTRAVENOUS AS NEEDED
Status: DISCONTINUED | OUTPATIENT
Start: 2023-05-10 | End: 2023-05-10 | Stop reason: SURG

## 2023-05-10 RX ADMIN — FENTANYL CITRATE 100 MCG: 50 INJECTION, SOLUTION INTRAMUSCULAR; INTRAVENOUS at 12:05

## 2023-05-10 RX ADMIN — OXYCODONE HYDROCHLORIDE 20 MG: 20 TABLET, FILM COATED, EXTENDED RELEASE ORAL at 07:45

## 2023-05-10 RX ADMIN — FENTANYL CITRATE 100 MCG: 50 INJECTION, SOLUTION INTRAMUSCULAR; INTRAVENOUS at 12:26

## 2023-05-10 RX ADMIN — FENTANYL CITRATE 150 MCG: 50 INJECTION, SOLUTION INTRAMUSCULAR; INTRAVENOUS at 11:52

## 2023-05-10 RX ADMIN — PROPOFOL 200 MG: 10 INJECTION, EMULSION INTRAVENOUS at 11:52

## 2023-05-10 RX ADMIN — PROPOFOL 100 MG: 10 INJECTION, EMULSION INTRAVENOUS at 12:52

## 2023-05-10 RX ADMIN — LABETALOL HYDROCHLORIDE 10 MG: 5 INJECTION INTRAVENOUS at 13:24

## 2023-05-10 RX ADMIN — ROCURONIUM BROMIDE 5 MG: 10 INJECTION, SOLUTION INTRAVENOUS at 11:52

## 2023-05-10 RX ADMIN — Medication 100 MG: at 11:54

## 2023-05-10 RX ADMIN — ONDANSETRON 4 MG: 2 INJECTION INTRAMUSCULAR; INTRAVENOUS at 14:45

## 2023-05-10 RX ADMIN — PROPOFOL 200 MCG/KG/MIN: 10 INJECTION, EMULSION INTRAVENOUS at 11:55

## 2023-05-10 RX ADMIN — FENTANYL CITRATE 25 MCG: 50 INJECTION, SOLUTION INTRAMUSCULAR; INTRAVENOUS at 15:50

## 2023-05-10 RX ADMIN — SODIUM CHLORIDE, POTASSIUM CHLORIDE, SODIUM LACTATE AND CALCIUM CHLORIDE 100 ML/HR: 600; 310; 30; 20 INJECTION, SOLUTION INTRAVENOUS at 07:35

## 2023-05-10 RX ADMIN — FENTANYL CITRATE 150 MCG: 50 INJECTION, SOLUTION INTRAMUSCULAR; INTRAVENOUS at 13:15

## 2023-05-10 RX ADMIN — LIDOCAINE HYDROCHLORIDE 100 MG: 20 INJECTION, SOLUTION EPIDURAL; INFILTRATION; INTRACAUDAL; PERINEURAL at 11:52

## 2023-05-10 RX ADMIN — Medication 3 G: at 11:59

## 2023-05-10 RX ADMIN — SODIUM CHLORIDE, POTASSIUM CHLORIDE, SODIUM LACTATE AND CALCIUM CHLORIDE 1000 ML: 600; 310; 30; 20 INJECTION, SOLUTION INTRAVENOUS at 11:16

## 2023-05-10 RX ADMIN — OXYCODONE HYDROCHLORIDE AND ACETAMINOPHEN 1 TABLET: 10; 325 TABLET ORAL at 16:53

## 2023-05-10 NOTE — ANESTHESIA PREPROCEDURE EVALUATION
Anesthesia Evaluation     Patient summary reviewed   no history of anesthetic complications:  NPO Solid Status: > 8 hours  NPO Liquid Status: > 8 hours           Airway   Mallampati: II  TM distance: >3 FB  Neck ROM: full  No difficulty expected  Dental - normal exam     Pulmonary - normal exam   (+) asthma,shortness of breath,   (-) wheezes  Cardiovascular - normal exam  Exercise tolerance: good (4-7 METS)    (+) hypertension,       Neuro/Psych  (+) numbness, psychiatric history Anxiety,    GI/Hepatic/Renal/Endo    (+) obesity, morbid obesity, GERD poorly controlled,  thyroid problem     Musculoskeletal     Abdominal  - normal exam   Substance History - negative use     OB/GYN negative ob/gyn ROS         Other   arthritis,                      Anesthesia Plan    ASA 2     general     intravenous induction     Anesthetic plan, risks, benefits, and alternatives have been provided, discussed and informed consent has been obtained with: patient.        CODE STATUS:

## 2023-05-10 NOTE — ANESTHESIA PROCEDURE NOTES
Airway  Urgency: elective    Date/Time: 5/10/2023 11:53 AM  Airway not difficult    General Information and Staff    Patient location during procedure: OR  CRNA/CAA: Yair Miguel CRNA    Indications and Patient Condition  Indications for airway management: airway protection    Preoxygenated: yes  Mask difficulty assessment: 1 - vent by mask    Final Airway Details  Final airway type: endotracheal airway      Successful airway: ETT     Successful intubation technique: direct laryngoscopy  Endotracheal tube insertion site: oral  Blade: Rivas  Blade size: 4 (3.5)  ETT size (mm): 7.5  Cormack-Lehane Classification: grade IIa - partial view of glottis  Placement verified by: chest auscultation and capnometry   Measured from: lips  Number of attempts at approach: 1  Assessment: lips, teeth, and gum same as pre-op and atraumatic intubation

## 2023-05-10 NOTE — ANESTHESIA POSTPROCEDURE EVALUATION
Patient: Pallavi Singh    Procedure Summary     Date: 05/10/23 Room / Location:  [facility] OR 05 /  [facility]    Anesthesia Start: 1149 Anesthesia Stop: 1526    Procedure: ARTIFICIAL DISC REPLACEMENT C5-7 (Spine Cervical) Diagnosis: (M47.22)    Surgeons: MELISSA Hernandez MD Provider: Yair Miguel CRNA    Anesthesia Type: general ASA Status: 2          Anesthesia Type: general    Vitals  Vitals Value Taken Time   /86 05/10/23 1716   Temp 98.1 °F (36.7 °C) 05/10/23 1655   Pulse 83 05/10/23 1717   Resp 16 05/10/23 1705   SpO2 96 % 05/10/23 1717   Vitals shown include unvalidated device data.        Post Anesthesia Care and Evaluation    Patient location during evaluation: PHASE II  Patient participation: complete - patient participated  Level of consciousness: awake and awake and alert  Pain score: 0  Pain management: adequate    Airway patency: patent  Anesthetic complications: No anesthetic complications  PONV Status: none  Cardiovascular status: acceptable  Respiratory status: acceptable  Hydration status: acceptable    Comments: Patient discharged according to acceptable Mary score per RN assessment. See nursing records for further information.     Blood pressure 151/89, pulse 80, temperature 98.1 °F (36.7 °C), resp. rate 16, last menstrual period 05/01/2023, SpO2 94 %, not currently breastfeeding.

## 2023-05-10 NOTE — OP NOTE
CERVICAL FUSION ANTERIOR WITH ARTIFICIAL DISCECTOMY IMPLANTATION  Procedure Note    Pallavi Singh  5/10/2023    Pre-op Diagnosis:     1. Neck pain.   2. Headaches.   3. Bilateral shoulder and arm radiculopathy, right much worse than left.   4. Mild degenerative disc disease C5 to C7.   5. Chronic disc herniation left C5-6.   6. Disc herniation right C6-7.   7. Central and foraminal stenosis, C5 to C7, worse left C5-6, severe right C6-7.   8. Morbid obesity, BMI of 52.42.    Post-op Diagnosis:    same    Procedure/CPT® Codes:    1.  Anterior cervical discectomy decompression C5-6, C6-7  2.  Artificial disc replacement C5-6, C6-7 (NuVasive Simplify x 2)  3.  Use of operating microscope for decompression and microdissection  4.  Use of fluoroscopy for confirmation of surgical level and insertion of artificial disc replacement implants  5.  Intraoperative neural monitoring    Anesthesia: General    Surgeon: DAVID Hernandez MD    Assistant: Santana Miranda PA-C    Estimated Blood Loss: 25 mL    Complications: None    Condition: Stable to PACU.    Indications:    The patient is a 42-year-old who sees Dr. Florentino Medina for medical issues.  She presented to the office with neck pain, headaches, as well as bilateral shoulder and arm radiculopathy, with the right side much worse than the left.  Imaging studies revealed mild degenerative disc disease from C5-7, but more importantly, she was noted to have a chronic disc herniation on the left side of C5-6 and a disc herniation on the right side at C6-7.  The disc herniations were creating central and foraminal stenosis at both levels and were felt to be contributing to the vast majority of her symptoms.    After failing conservative measures, it was mutually decided that surgery would be the best option.  Risks, benefits, and complications of surgery were discussed with the patient.  The patient appeared well informed and wished to proceed.  We specifically discussed the  risks of infection, blood loss, nerve root injury, CSF leak, spinal cord injury, and the possibility of incomplete resolution of symptoms.    Operative Procedure:    After obtaining informed consent and verifying the correct operative level, the patient was brought to the operating room and placed supine on an operating table.  A general anesthetic was provided by the anesthesia service with the assistance of an endotracheal tube.  Once this was properly positioned and secured, a bump was placed under the patient's shoulders placing the neck into a gentle extended position.  Head halter traction was then used with 10 pounds weight to maintain head position.  The shoulders were taped using 3 inch wide cloth tape to assist with radiographic visualization.  Fluoroscopy was used to identify the disc spaces of C5-6 and C6-7, and the skin was marked for our planned incision.  The anterior neck region was then prepped and draped in the usual sterile fashion.  A surgical timeout was taken to confirm this was the correct patient, we were working at the correct levels, and that preoperative antibiotics were given in a timely fashion.    A transverse incision was then created over the anterior cervical region using a 10 blade scalpel directly centered over the levels of interest.  Dissection was carried sharply through subcutaneous tissues.  The platysma was divided in line with the incision and blunt dissection was carried along the medial border of the sternocleidomastoid muscle, lateral to the strap musculature the neck.  The carotid pulse was then palpated, and dissection was carried medial to the carotid sheath and lateral to the trachea and esophagus.  Handheld Cloward retractors along with Kitners were used to dissect through pretracheal and prevertebral fascia.  A radiographic marker was placed into the disc space of C5-6 and fluoroscopy was used to confirm that we were indeed at the correct levels.  The longus coli  muscles were then elevated from either side of the spine using Bovie cautery further exposing the spine at both C5-6 and C6-7.    An initial discectomy was started at both C5-6 and C6-7 by creating an annulotomy with Bovie cautery.  A Hendrickson elevator was used to remove some of the disc material off of the endplates.  Disc material was initially removed using forward angled curettes and pituitaries.  [place] pins were then placed into C5 and C7 to allow gentle distraction across the disc spaces.  The microscope was then positioned for the microdissection and decompression portion of the procedure.    Both C5-6 and C6-7 were prepared in an identical fashion.  I used Kerrisons to remove anterior osteophytes off of the endplates.  Straight curettes were used to remove the cartilaginous endplates and all remaining disc material.  A forward angled curette was used to free up the posterior margins of the vertebral bodies, releasing the posterior longitudinal ligament.  Posterior osteophytes, posterior disc material, and the PLL were all resected using Kerrisons.  Kerrisons were also used to perform a bilateral neural foraminotomy.  At the end of the discectomy decompression, the central spinal canal and the exiting nerve roots appeared to be free of compression at both levels.  Bleeding in the epidural spaces was controlled using FloSeal.  The wound was then copiously irrigated with saline solution.    Next the NuVasive Simplify trial spacers were placed into the C6-7 disc space.  After several trial implants were placed, it was felt that a medium 5 height implant would be the best fit.  The matching keel cutting device for the size implant was then malleted into the disc space under fluoroscopic guidance.  A small curette was used in the keel cuts to ensure that there was no excess bone and that the implant would have proper passage.  The wound was copiously irrigated with saline solution.  Bleeding in the epidural space was  once again controlled using FloSeal.    A NuVasive Simplify implant size medium with 5 height was then delivered sterilely to the field.  The implant components were assembled on the appropriate .  This implant was then tapped into position into the C6-7 disc space under fluoroscopic guidance.    The same NuVasive Simplify trial spacers were then placed into the C5-6 disc space.  Again after trialing several spacers, it was felt that a medium for height implant would be the best fit.  The matching keel cutting device for this implant size was then malleted into the disc space under fluoroscopic guidance using the same technique as the other level.  I again used a small curette and the keel cuts to ensure no excess bone was remaining to allow proper passage of the implant.  The wound was then copiously irrigated with saline solution.  Bleeding in the epidural space was once again controlled using FloSeal.    A second NuVasive Simplify implant size medium with 4 height was then delivered sterilely to the field and attached to the appropriate .  This implant was then gently tapped into position into the C5-6 disc space under fluoroscopic guidance.    The Muse pins were then removed with the appropriate screwdriver and the remaining holes were also filled with FloSeal followed by bone wax.  Bone wax was also used over the exposed keel cuts.  A final inspection of the entire operative field was then undertaken to ensure that we had adequate hemostasis.  The wound was then copiously irrigated with saline solution.    Final fluoroscopy imaging was used to confirm adequate position of the artificial disc replacement implants in both the AP and lateral projections.  All implants appeared to be properly positioned with good maintenance of cervical alignment.        After once again ensuring that we had adequate hemostasis, closure was then accomplished by reapproximating the platysma with a 3-0 Vicryl.   Immediate subcutaneous tissues were reapproximated with a 4-0 Vicryl in a buried fashion.  Final skin closure was accomplished with Mastisol and Steri-Strips.  The wound was then washed and a sterile Bioclusive dressing was applied.  The patient was then placed into a soft cervical collar, extubated, and sent to the recovery room in good stable condition.    The patient tolerated the procedure well.  There were no complications.  We estimated blood loss to be approximate 25 mL.    Intraoperative neuro monitoring was ordered and carried out throughout the procedure to add an increased level of safety for the patient.  The interpreting physician was available by means of real-time continuous, bidirectional, remote audio and visual communication as needed throughout the entire procedure.  Modalities used during the procedure included SSEP, EEG, MEP, EMG, and TOF.  There were no neuro monitoring signal changes during the procedure.    Santana Miranda PA-C provided critical assistance during the procedure.  His assistance was medically necessary in order to allow the procedure to occur in the most safe and efficient manner.  He assisted with not only patient positioning and wound closure, but more importantly with retraction of delicate neurovascular structures, assistance during the discectomy decompression, as well as the placement of the artificial disc replacement implants.    DAVID Hernandez MD     Date: 5/10/2023  Time: 14:59 CDT

## 2023-06-13 DIAGNOSIS — F90.9 ATTENTION DEFICIT HYPERACTIVITY DISORDER (ADHD), UNSPECIFIED ADHD TYPE: ICD-10-CM

## 2023-06-13 NOTE — TELEPHONE ENCOUNTER
Rx Refill Note  Requested Prescriptions     Pending Prescriptions Disp Refills    atomoxetine (STRATTERA) 40 MG capsule [Pharmacy Med Name: ATOMOXETINE 40MG CAPSULES] 30 capsule 2     Sig: TAKE 1 CAPSULE BY MOUTH DAILY      Last office visit with prescribing clinician: 6/12/2023   Last telemedicine visit with prescribing clinician: Visit date not found   Next office visit with prescribing clinician: Visit date not found                         Would you like a call back once the refill request has been completed: [] Yes [] No    If the office needs to give you a call back, can they leave a voicemail: [] Yes [] No    DARREL Burton  06/13/23, 09:11 CDT

## 2023-06-15 RX ORDER — ATOMOXETINE 40 MG/1
40 CAPSULE ORAL DAILY
Qty: 30 CAPSULE | Refills: 2 | Status: SHIPPED | OUTPATIENT
Start: 2023-06-15

## 2023-07-26 ENCOUNTER — TRANSCRIBE ORDERS (OUTPATIENT)
Dept: ADMINISTRATIVE | Facility: HOSPITAL | Age: 43
End: 2023-07-26
Payer: MEDICAID

## 2023-07-26 DIAGNOSIS — M54.2 CERVICALGIA: Primary | ICD-10-CM

## 2023-07-27 ENCOUNTER — TELEPHONE (OUTPATIENT)
Dept: FAMILY MEDICINE CLINIC | Facility: CLINIC | Age: 43
End: 2023-07-27

## 2023-07-27 NOTE — TELEPHONE ENCOUNTER
Caller: Pallavi Singh    Relationship: Self    Best call back number:     [phone]       What is the medical concern/diagnosis: WEIGHT LOSS    What specialty or service is being requested: WANTS TO SEE A WEIGHT LOSS SPECIALIST DR. DAWNA CUEVA    What is the provider, practice or medical service name: SEE ABOVE

## 2023-08-01 ENCOUNTER — HOSPITAL ENCOUNTER (OUTPATIENT)
Dept: NEUROLOGY | Facility: HOSPITAL | Age: 43
Discharge: HOME | End: 2023-08-01
Admitting: PHYSICIAN ASSISTANT
Payer: MEDICAID

## 2023-08-01 DIAGNOSIS — M54.2 CERVICALGIA: ICD-10-CM

## 2023-08-01 PROCEDURE — 95886 MUSC TEST DONE W/N TEST COMP: CPT

## 2023-08-01 PROCEDURE — 95911 NRV CNDJ TEST 9-10 STUDIES: CPT

## 2023-08-02 ENCOUNTER — OFFICE VISIT (OUTPATIENT)
Dept: FAMILY MEDICINE CLINIC | Facility: CLINIC | Age: 43
End: 2023-08-02
Payer: MEDICAID

## 2023-08-02 ENCOUNTER — LAB (OUTPATIENT)
Dept: LAB | Facility: HOSPITAL | Age: 43
End: 2023-08-02
Payer: MEDICAID

## 2023-08-02 VITALS
DIASTOLIC BLOOD PRESSURE: 80 MMHG | WEIGHT: 293 LBS | BODY MASS INDEX: 48.82 KG/M2 | HEART RATE: 90 BPM | OXYGEN SATURATION: 98 % | SYSTOLIC BLOOD PRESSURE: 125 MMHG | HEIGHT: 65 IN

## 2023-08-02 DIAGNOSIS — E66.01 CLASS 3 SEVERE OBESITY DUE TO EXCESS CALORIES WITH SERIOUS COMORBIDITY AND BODY MASS INDEX (BMI) OF 50.0 TO 59.9 IN ADULT: ICD-10-CM

## 2023-08-02 DIAGNOSIS — E66.01 CLASS 3 SEVERE OBESITY DUE TO EXCESS CALORIES WITH SERIOUS COMORBIDITY AND BODY MASS INDEX (BMI) OF 50.0 TO 59.9 IN ADULT: Primary | ICD-10-CM

## 2023-08-02 DIAGNOSIS — F41.1 GAD (GENERALIZED ANXIETY DISORDER): ICD-10-CM

## 2023-08-02 LAB
ALBUMIN SERPL-MCNC: 4.4 G/DL (ref 3.5–5.2)
ALBUMIN/GLOB SERPL: 1.5 G/DL
ALP SERPL-CCNC: 84 U/L (ref 39–117)
ALT SERPL W P-5'-P-CCNC: 15 U/L (ref 1–33)
ANION GAP SERPL CALCULATED.3IONS-SCNC: 12 MMOL/L (ref 5–15)
AST SERPL-CCNC: 15 U/L (ref 1–32)
BILIRUB SERPL-MCNC: 0.3 MG/DL (ref 0–1.2)
BUN SERPL-MCNC: 9 MG/DL (ref 6–20)
BUN/CREAT SERPL: 13.4 (ref 7–25)
CALCIUM SPEC-SCNC: 9 MG/DL (ref 8.6–10.5)
CHLORIDE SERPL-SCNC: 105 MMOL/L (ref 98–107)
CHOLEST SERPL-MCNC: 179 MG/DL (ref 0–200)
CO2 SERPL-SCNC: 23 MMOL/L (ref 22–29)
CREAT SERPL-MCNC: 0.67 MG/DL (ref 0.57–1)
EGFRCR SERPLBLD CKD-EPI 2021: 112.8 ML/MIN/1.73
GLOBULIN UR ELPH-MCNC: 2.9 GM/DL
GLUCOSE SERPL-MCNC: 106 MG/DL (ref 65–99)
HBA1C MFR BLD: 5.7 % (ref 4.8–5.6)
HDLC SERPL-MCNC: 55 MG/DL (ref 40–60)
LDLC SERPL CALC-MCNC: 104 MG/DL (ref 0–100)
LDLC/HDLC SERPL: 1.85 {RATIO}
POTASSIUM SERPL-SCNC: 4.6 MMOL/L (ref 3.5–5.2)
PROT SERPL-MCNC: 7.3 G/DL (ref 6–8.5)
SODIUM SERPL-SCNC: 140 MMOL/L (ref 136–145)
TRIGL SERPL-MCNC: 111 MG/DL (ref 0–150)
TSH SERPL DL<=0.05 MIU/L-ACNC: 1.56 UIU/ML (ref 0.27–4.2)
VLDLC SERPL-MCNC: 20 MG/DL (ref 5–40)

## 2023-08-02 PROCEDURE — 1159F MED LIST DOCD IN RCRD: CPT | Performed by: FAMILY MEDICINE

## 2023-08-02 PROCEDURE — 99214 OFFICE O/P EST MOD 30 MIN: CPT | Performed by: FAMILY MEDICINE

## 2023-08-02 PROCEDURE — 1160F RVW MEDS BY RX/DR IN RCRD: CPT | Performed by: FAMILY MEDICINE

## 2023-08-02 PROCEDURE — 83036 HEMOGLOBIN GLYCOSYLATED A1C: CPT

## 2023-08-02 PROCEDURE — 80061 LIPID PANEL: CPT

## 2023-08-02 PROCEDURE — 84443 ASSAY THYROID STIM HORMONE: CPT

## 2023-08-02 PROCEDURE — 80053 COMPREHEN METABOLIC PANEL: CPT

## 2023-08-02 PROCEDURE — 36415 COLL VENOUS BLD VENIPUNCTURE: CPT

## 2023-08-03 ENCOUNTER — TELEPHONE (OUTPATIENT)
Dept: FAMILY MEDICINE CLINIC | Facility: CLINIC | Age: 43
End: 2023-08-03
Payer: MEDICAID

## 2023-08-03 NOTE — TELEPHONE ENCOUNTER
Caller: Pallavi Singh    Relationship: Self    Best call back number: [phone]     What is the best time to reach you: ANY    Who are you requesting to speak with (clinical staff, provider,  specific staff member): CLINICAL         What was the call regarding: HAD VISIT YESTERDAY, SHE UNDER THE IMPRESSION THAT DR. BALTAZAR WAS GIVING HER SOMETHING FOR ANXIETY.  SHE WANTS ME TO MENTION THAT SHE COULD HAVE MISUNDERSTOOD.  MAYBE WAITING FOR LABS TO COME BACK? SHE REPORTS THEY ARE BACK AND I ALSO SEE THEM AS WELL.    SHE IS GOING TO [place] IN AM, CAN YOU CALL BACK AND ADVISE ABOUT THIS PLEASE?   I WILL MAKE HIGH PRIORITY SINCE SHE IS WAITING FOR THIS AND TRAVELING IN AM.  SHE SAID YOU ARE FREE TO LEAVE A V.M. IF YOU DON'T REACH HER.      Is it okay if the provider responds through [employer]: PLEASE CALL

## 2023-08-04 ENCOUNTER — TELEPHONE (OUTPATIENT)
Dept: FAMILY MEDICINE CLINIC | Facility: CLINIC | Age: 43
End: 2023-08-04
Payer: MEDICAID

## 2023-08-04 RX ORDER — BUPROPION HYDROCHLORIDE 150 MG/1
150 TABLET ORAL DAILY
Qty: 90 TABLET | Refills: 0 | Status: SHIPPED | OUTPATIENT
Start: 2023-08-04

## 2023-08-09 ENCOUNTER — TELEPHONE (OUTPATIENT)
Dept: FAMILY MEDICINE CLINIC | Facility: CLINIC | Age: 43
End: 2023-08-09
Payer: MEDICAID

## 2023-08-09 NOTE — TELEPHONE ENCOUNTER
Caller: Pallavi Singh    Relationship: Self    Best call back number: [phone]     What orders are you requesting (i.e. lab or imaging): ORDER FOR AN MRI    In what timeframe would the patient need to come in: ASAP    Where will you receive your lab/imaging services: N/A    Additional notes: PATIENT REQUESTING DR. BALTAZAR TO PLACE AN ORDER FOR AN MRI DUE TO HER BACK PAIN.

## 2023-08-13 NOTE — PROGRESS NOTES
"Chief Complaint  Follow-up (Follow up pt states; wanting to discuss weight loss surgery )    Subjective        Pallavi Singh presents to [facility]  History of Present Illness  Interested in weight loss surgery referral  Wants outside referral for second opinion  Anxiety improved with wellbutrin, needs refill    Objective   Vital Signs:  /80 (BP Location: Left arm, Patient Position: Sitting, Cuff Size: Adult)   Pulse 90   Ht 165.1 cm (65\")   Wt (!) 137 kg (303 lb)   SpO2 98%   BMI 50.42 kg/mý   Estimated body mass index is 50.42 kg/mý as calculated from the following:    Height as of this encounter: 165.1 cm (65\").    Weight as of this encounter: 137 kg (303 lb).               Physical Exam  Vitals and nursing note reviewed.   Constitutional:       General: She is not in acute distress.     Appearance: She is not diaphoretic.   HENT:      Head: Normocephalic and atraumatic.      Nose: Nose normal.   Eyes:      General: No scleral icterus.        Right eye: No discharge.         Left eye: No discharge.      Conjunctiva/sclera: Conjunctivae normal.   Neck:      Trachea: No tracheal deviation.   Pulmonary:      Effort: Pulmonary effort is normal.   Skin:     General: Skin is warm and dry.      Coloration: Skin is not pale.   Neurological:      Mental Status: She is alert and oriented to person, place, and time.   Psychiatric:         Behavior: Behavior normal.         Thought Content: Thought content normal.         Judgment: Judgment normal.      Result Review :                   Assessment and Plan   Diagnoses and all orders for this visit:    1. Class 3 severe obesity due to excess calories with serious comorbidity and body mass index (BMI) of 50.0 to 59.9 in adult (Primary)  -     Cancel: Ambulatory Referral to Bariatric Surgery  -     Comprehensive Metabolic Panel; Future  -     Lipid panel; Future  -     TSH; Future  -     Hemoglobin A1c; Future  -     Ambulatory " Referral to Bariatric Surgery    2. JENNIFER (generalized anxiety disorder)  -     buPROPion XL (Wellbutrin XL) 150 MG 24 hr tablet; Take 1 tablet by mouth Daily.  Dispense: 90 tablet; Refill: 0             Follow Up   Return in about 3 months (around 11/2/2023).  Patient was given instructions and counseling regarding her condition or for health maintenance advice. Please see specific information pulled into the AVS if appropriate.

## 2023-08-14 NOTE — TELEPHONE ENCOUNTER
Caller: Pallavi Singh    Relationship: Self    Best call back number: [phone]        What specialty or service is being requested: MRI REFERRAL         What is the office location: [facility]        Any additional details: PATIENT HAS GONE TO PHYSICAL THERAPY A FEW TIMES BUT IT HASN'T BEEN BENEFICIAL FOR HER LOWER BACK

## 2023-09-01 ENCOUNTER — TRANSCRIBE ORDERS (OUTPATIENT)
Dept: ADMINISTRATIVE | Facility: HOSPITAL | Age: 43
End: 2023-09-01
Payer: MEDICAID

## 2023-09-01 DIAGNOSIS — K21.9 GASTROESOPHAGEAL REFLUX DISEASE WITHOUT ESOPHAGITIS: Primary | ICD-10-CM

## 2023-09-08 ENCOUNTER — HOSPITAL ENCOUNTER (OUTPATIENT)
Dept: GENERAL RADIOLOGY | Facility: HOSPITAL | Age: 43
Discharge: HOME | End: 2023-09-08
Payer: MEDICAID

## 2023-09-08 DIAGNOSIS — K21.9 GASTROESOPHAGEAL REFLUX DISEASE WITHOUT ESOPHAGITIS: ICD-10-CM

## 2023-09-08 PROCEDURE — 74220 X-RAY XM ESOPHAGUS 1CNTRST: CPT

## 2023-09-08 RX ADMIN — BARIUM SULFATE 700 MG: 700 TABLET ORAL at 12:30

## 2023-09-08 RX ADMIN — BARIUM SULFATE 240 ML: 960 POWDER, FOR SUSPENSION ORAL at 12:30

## 2023-09-08 RX ADMIN — ANTACID/ANTIFLATULENT 1 PACKET: 380; 550; 10; 10 GRANULE, EFFERVESCENT ORAL at 12:30

## 2023-09-15 ENCOUNTER — OFFICE VISIT (OUTPATIENT)
Dept: FAMILY MEDICINE CLINIC | Facility: CLINIC | Age: 43
End: 2023-09-15
Payer: MEDICAID

## 2023-09-15 VITALS
RESPIRATION RATE: 16 BRPM | HEART RATE: 107 BPM | OXYGEN SATURATION: 98 % | HEIGHT: 65 IN | DIASTOLIC BLOOD PRESSURE: 82 MMHG | BODY MASS INDEX: 48.82 KG/M2 | WEIGHT: 293 LBS | SYSTOLIC BLOOD PRESSURE: 140 MMHG

## 2023-09-15 DIAGNOSIS — R05.9 COUGH, UNSPECIFIED TYPE: Primary | ICD-10-CM

## 2023-09-15 DIAGNOSIS — R05.9 COUGH, UNSPECIFIED TYPE: ICD-10-CM

## 2023-09-15 PROCEDURE — 99213 OFFICE O/P EST LOW 20 MIN: CPT | Performed by: FAMILY MEDICINE

## 2023-09-15 RX ORDER — METHYLPREDNISOLONE 4 MG/1
TABLET ORAL
Qty: 21 TABLET | Refills: 0 | Status: SHIPPED | OUTPATIENT
Start: 2023-09-15

## 2023-09-15 RX ORDER — OMEPRAZOLE 40 MG/1
40 CAPSULE, DELAYED RELEASE ORAL DAILY
Qty: 30 CAPSULE | Refills: 1 | Status: SHIPPED | OUTPATIENT
Start: 2023-09-15 | End: 2023-09-15

## 2023-09-15 RX ORDER — FLUTICASONE PROPIONATE 50 MCG
2 SPRAY, SUSPENSION (ML) NASAL DAILY
Qty: 16 G | Refills: 2 | Status: SHIPPED | OUTPATIENT
Start: 2023-09-15

## 2023-09-15 RX ORDER — HYDROXYZINE HYDROCHLORIDE 25 MG/1
25 TABLET, FILM COATED ORAL EVERY 8 HOURS PRN
Qty: 90 TABLET | Refills: 3 | Status: SHIPPED | OUTPATIENT
Start: 2023-09-15

## 2023-09-15 RX ORDER — CETIRIZINE HYDROCHLORIDE 10 MG/1
10 TABLET ORAL DAILY
Qty: 90 TABLET | Refills: 3 | Status: SHIPPED | OUTPATIENT
Start: 2023-09-15

## 2023-09-15 RX ORDER — OMEPRAZOLE 40 MG/1
40 CAPSULE, DELAYED RELEASE ORAL DAILY
Qty: 90 CAPSULE | Refills: 3 | Status: SHIPPED | OUTPATIENT
Start: 2023-09-15

## 2023-09-15 NOTE — TELEPHONE ENCOUNTER
Rx Refill Note  Requested Prescriptions     Pending Prescriptions Disp Refills    omeprazole (priLOSEC) 40 MG capsule [Pharmacy Med Name: OMEPRAZOLE 40MG CAPSULES] 90 capsule 3     Sig: TAKE 1 CAPSULE BY MOUTH DAILY      Last office visit with prescribing clinician: 9/15/2023   Last telemedicine visit with prescribing clinician: Visit date not found   Next office visit with prescribing clinician: Visit date not found                         Would you like a call back once the refill request has been completed: [] Yes [] No    If the office needs to give you a call back, can they leave a voicemail: [] Yes [] No    Tahmina Farooq MA  09/15/23, 11:11 CDT

## 2023-09-18 NOTE — PROGRESS NOTES
"Chief Complaint  Cough (Cough, congestion, Covid Neg yesterday. 6 or 7 years ago pt had chronic cough for 7 months, tingle, dry cough. ) and Headache    Subjective        Pallavi Singh presents to [facility]  Cough  Associated symptoms include headaches.   Headache  7 months of dry cough   No recent cold or flu  Reports post nasal drip, occasional GERD  No SOB    Objective   Vital Signs:  /82 (BP Location: Right arm, Patient Position: Sitting, Cuff Size: Adult)   Pulse 107   Resp 16   Ht 165.1 cm (65\")   Wt (!) 143 kg (314 lb 14.4 oz)   SpO2 98%   BMI 52.40 kg/m²   Estimated body mass index is 52.4 kg/m² as calculated from the following:    Height as of this encounter: 165.1 cm (65\").    Weight as of this encounter: 143 kg (314 lb 14.4 oz).               Physical Exam  Vitals and nursing note reviewed.   Constitutional:       General: She is not in acute distress.     Appearance: She is not diaphoretic.   HENT:      Head: Normocephalic and atraumatic.      Right Ear: Tympanic membrane normal.      Left Ear: Tympanic membrane normal.      Nose: Nose normal. Congestion and rhinorrhea present.   Eyes:      General: No scleral icterus.        Right eye: No discharge.         Left eye: No discharge.      Conjunctiva/sclera: Conjunctivae normal.   Neck:      Trachea: No tracheal deviation.   Cardiovascular:      Rate and Rhythm: Normal rate and regular rhythm.      Heart sounds: Normal heart sounds. No murmur heard.    No friction rub. No gallop.   Pulmonary:      Effort: Pulmonary effort is normal. No respiratory distress.      Breath sounds: Normal breath sounds. No wheezing or rales.   Skin:     General: Skin is warm and dry.      Coloration: Skin is not pale.   Neurological:      Mental Status: She is alert and oriented to person, place, and time.   Psychiatric:         Behavior: Behavior normal.         Thought Content: Thought content normal.         Judgment: Judgment " normal.      Result Review :                   Assessment and Plan   Diagnoses and all orders for this visit:    1. Cough, unspecified type (Primary)  -      omeprazole (priLOSEC) 40 MG capsule; Take 1 capsule by mouth Daily.  Dispense: 30 capsule; Refill: 1  -     methylPREDNISolone (MEDROL) 4 MG dose pack; Take as directed on package instructions.  Dispense: 21 tablet; Refill: 0  -     cetirizine (zyrTEC) 10 MG tablet; Take 1 tablet by mouth Daily.  Dispense: 90 tablet; Refill: 3  -     fluticasone (FLONASE) 50 MCG/ACT nasal spray; 2 sprays into the nostril(s) as directed by provider Daily.  Dispense: 16 g; Refill: 2  -     hydrOXYzine (ATARAX) 25 MG tablet; Take 1 tablet by mouth Every 8 (Eight) Hours As Needed for Allergies.  Dispense: 90 tablet; Refill: 3    Suspect combined GERD and allergies  F/u PRN         Follow Up   Return if symptoms worsen or fail to improve.  Patient was given instructions and counseling regarding her condition or for health maintenance advice. Please see specific information pulled into the AVS if appropriate.

## 2023-09-19 ENCOUNTER — PATIENT MESSAGE (OUTPATIENT)
Dept: FAMILY MEDICINE CLINIC | Facility: CLINIC | Age: 43
End: 2023-09-19
Payer: MEDICAID

## 2023-09-21 NOTE — TELEPHONE ENCOUNTER
From: Pallavi Singh  To: Florentino Medina  Sent: 9/19/2023 12:36 PM CDT  Subject: Inhaler?     I was curious as to if I could get a prescription for an inhaler? Previous doctor prescribed one in the past, that I use very sparingly, but still like to have just in case, but it is now officially empty/out.     Should have mentioned it when I was there the other day, sorry.

## 2023-10-06 DIAGNOSIS — F90.9 ATTENTION DEFICIT HYPERACTIVITY DISORDER (ADHD), UNSPECIFIED ADHD TYPE: ICD-10-CM

## 2023-10-06 NOTE — TELEPHONE ENCOUNTER
Rx Refill Note  Requested Prescriptions     Pending Prescriptions Disp Refills    atomoxetine (STRATTERA) 40 MG capsule [Pharmacy Med Name: ATOMOXETINE 40MG CAPSULES] 30 capsule 2     Sig: TAKE 1 CAPSULE BY MOUTH DAILY      Last office visit with prescribing clinician: 9/15/2023   Last telemedicine visit with prescribing clinician: Visit date not found   Next office visit with prescribing clinician: Visit date not found                         Would you like a call back once the refill request has been completed: [] Yes [] No    If the office needs to give you a call back, can they leave a voicemail: [] Yes [] No    Tahmina Farooq MA  10/06/23, 13:13 CDT

## 2023-10-07 RX ORDER — ATOMOXETINE 40 MG/1
40 CAPSULE ORAL DAILY
Qty: 30 CAPSULE | Refills: 2 | Status: SHIPPED | OUTPATIENT
Start: 2023-10-07

## 2023-10-22 DIAGNOSIS — F41.1 GAD (GENERALIZED ANXIETY DISORDER): ICD-10-CM

## 2023-10-23 RX ORDER — BUPROPION HYDROCHLORIDE 150 MG/1
150 TABLET ORAL DAILY
Qty: 90 TABLET | Refills: 0 | Status: SHIPPED | OUTPATIENT
Start: 2023-10-23

## 2023-11-02 RX ORDER — ACETAMINOPHEN AND CODEINE PHOSPHATE 120; 12 MG/5ML; MG/5ML
1 SOLUTION ORAL DAILY
Qty: 28 TABLET | Refills: 12 | Status: SHIPPED | OUTPATIENT
Start: 2023-11-02 | End: 2024-11-01

## 2023-12-13 DIAGNOSIS — R05.9 COUGH, UNSPECIFIED TYPE: ICD-10-CM

## 2023-12-13 DIAGNOSIS — E66.01 CLASS 3 SEVERE OBESITY DUE TO EXCESS CALORIES WITH SERIOUS COMORBIDITY AND BODY MASS INDEX (BMI) OF 50.0 TO 59.9 IN ADULT: Primary | ICD-10-CM

## 2023-12-13 RX ORDER — LEVOTHYROXINE SODIUM 0.07 MG/1
75 TABLET ORAL DAILY
Qty: 90 TABLET | Refills: 0 | Status: SHIPPED | OUTPATIENT
Start: 2023-12-13

## 2023-12-13 RX ORDER — FLUTICASONE PROPIONATE 50 MCG
2 SPRAY, SUSPENSION (ML) NASAL DAILY
Qty: 16 G | Refills: 2 | Status: SHIPPED | OUTPATIENT
Start: 2023-12-13

## 2023-12-13 NOTE — TELEPHONE ENCOUNTER
Rx Refill Note  Requested Prescriptions     Pending Prescriptions Disp Refills    fluticasone (FLONASE) 50 MCG/ACT nasal spray [Pharmacy Med Name: FLUTICASONE 50MCG NASAL SP (120) RX] 16 g 2     Sig: USE 2 SPRAYS IN EACH NOSTRIL EVERY DAY      Last office visit with prescribing clinician: 9/15/2023   Last telemedicine visit with prescribing clinician: Visit date not found   Next office visit with prescribing clinician: Visit date not found                         Would you like a call back once the refill request has been completed: [] Yes [] No    If the office needs to give you a call back, can they leave a voicemail: [] Yes [] No    Prosper Duckworth MA  12/13/23, 09:58 CST

## 2023-12-13 NOTE — TELEPHONE ENCOUNTER
Caller: Pallavi Singh    Relationship: Self    Best call back number: [phone]     Requested Prescriptions:   Requested Prescriptions     Pending Prescriptions Disp Refills    levothyroxine (SYNTHROID, LEVOTHROID) 75 MCG tablet          Pharmacy where request should be sent: [id number] [place] [facility] [address]  - [phone] [facility] [phone] FX     Last office visit with prescribing clinician: 9/15/2023   Last telemedicine visit with prescribing clinician: Visit date not found   Next office visit with prescribing clinician: 12/13/2023     Additional details provided by patient: PATIENT HAS 2 PILLS LEFT    Does the patient have less than a 3 day supply:  [x] Yes  [] No    Would you like a call back once the refill request has been completed: [x] Yes [] No    If the office needs to give you a call back, can they leave a voicemail: [x] Yes [] No    Rivka Gupta Rep   12/13/23 10:07 CST

## 2024-01-05 DIAGNOSIS — F90.9 ATTENTION DEFICIT HYPERACTIVITY DISORDER (ADHD), UNSPECIFIED ADHD TYPE: ICD-10-CM

## 2024-01-05 DIAGNOSIS — F41.1 GAD (GENERALIZED ANXIETY DISORDER): ICD-10-CM

## 2024-01-08 RX ORDER — ATOMOXETINE 40 MG/1
40 CAPSULE ORAL DAILY
Qty: 30 CAPSULE | Refills: 2 | Status: SHIPPED | OUTPATIENT
Start: 2024-01-08

## 2024-01-08 RX ORDER — BUPROPION HYDROCHLORIDE 150 MG/1
150 TABLET ORAL DAILY
Qty: 90 TABLET | Refills: 0 | Status: SHIPPED | OUTPATIENT
Start: 2024-01-08

## 2024-01-08 NOTE — TELEPHONE ENCOUNTER
Rx Refill Note  Requested Prescriptions     Pending Prescriptions Disp Refills    atomoxetine (STRATTERA) 40 MG capsule 30 capsule 2     Sig: Take 1 capsule by mouth Daily.    buPROPion XL (Wellbutrin XL) 150 MG 24 hr tablet 90 tablet 0     Sig: Take 1 tablet by mouth Daily.      Last office visit with prescribing clinician: 9/15/2023   Last telemedicine visit with prescribing clinician: Visit date not found   Next office visit with prescribing clinician: Visit date not found                         Would you like a call back once the refill request has been completed: [] Yes [] No    If the office needs to give you a call back, can they leave a voicemail: [] Yes [] No    Prosper Duckworth MA  01/08/24, 09:12 CST

## 2024-01-18 DIAGNOSIS — E66.01 CLASS 3 SEVERE OBESITY DUE TO EXCESS CALORIES WITH SERIOUS COMORBIDITY AND BODY MASS INDEX (BMI) OF 50.0 TO 59.9 IN ADULT: ICD-10-CM

## 2024-01-18 RX ORDER — LEVOTHYROXINE SODIUM 0.07 MG/1
75 TABLET ORAL DAILY
Qty: 90 TABLET | Refills: 0 | OUTPATIENT
Start: 2024-01-18

## 2024-01-18 NOTE — TELEPHONE ENCOUNTER
Caller: Pallavi Singh    Relationship: Self    Best call back number: [phone]     Requested Prescriptions: PREGABALIN 75 MG TWICE A DAY     Pharmacy where request should be sent: [facility] #[id number] - [place] - [address]  - [phone]  - [phone] FX     Last office visit with prescribing clinician: 9/15/2023   Last telemedicine visit with prescribing clinician: Visit date not found   Next office visit with prescribing clinician: Visit date not found     Additional details provided by patient: PATIENT STATES SHE IS OUT OF THIS MEDICATION, SHE GETS THIS MEDICATION FROM A ORTHOPEDIC OFFICE MOVED, THE OFFICE IS NO LONGER REACHABLE. PATIENT WANTS TO KNOW IF HER PROVIDER CAN REFILL THIS MEDICATION.    Does the patient have less than a 3 day supply:  [x] Yes  [] No    Would you like a call back once the refill request has been completed: [] Yes [x] No    If the office needs to give you a call back, can they leave a voicemail: [] Yes [x] No    Rivka Otoole Rep   01/18/24 10:23 CST

## 2024-02-01 DIAGNOSIS — E66.01 CLASS 3 SEVERE OBESITY DUE TO EXCESS CALORIES WITH SERIOUS COMORBIDITY AND BODY MASS INDEX (BMI) OF 50.0 TO 59.9 IN ADULT: ICD-10-CM

## 2024-02-01 RX ORDER — LEVOTHYROXINE SODIUM 0.07 MG/1
75 TABLET ORAL DAILY
Qty: 90 TABLET | Refills: 0 | Status: SHIPPED | OUTPATIENT
Start: 2024-02-01

## 2024-02-02 ENCOUNTER — TELEMEDICINE (OUTPATIENT)
Dept: FAMILY MEDICINE CLINIC | Facility: CLINIC | Age: 44
End: 2024-02-02
Payer: MEDICAID

## 2024-02-02 DIAGNOSIS — E66.01 CLASS 3 SEVERE OBESITY DUE TO EXCESS CALORIES WITH SERIOUS COMORBIDITY AND BODY MASS INDEX (BMI) OF 50.0 TO 59.9 IN ADULT: ICD-10-CM

## 2024-02-02 DIAGNOSIS — L98.9 PRECANCEROUS SKIN LESION: Primary | ICD-10-CM

## 2024-02-02 DIAGNOSIS — E87.1 HYPONATREMIA: ICD-10-CM

## 2024-02-02 DIAGNOSIS — L98.9 FACIAL LESION: ICD-10-CM

## 2024-02-02 DIAGNOSIS — F41.1 GAD (GENERALIZED ANXIETY DISORDER): ICD-10-CM

## 2024-02-02 DIAGNOSIS — F98.8 ATTENTION DEFICIT DISORDER (ADD) WITHOUT HYPERACTIVITY: ICD-10-CM

## 2024-02-02 DIAGNOSIS — J30.89 ALLERGIC RHINITIS DUE TO OTHER ALLERGIC TRIGGER, UNSPECIFIED SEASONALITY: ICD-10-CM

## 2024-02-02 PROCEDURE — 1159F MED LIST DOCD IN RCRD: CPT | Performed by: NURSE PRACTITIONER

## 2024-02-02 PROCEDURE — 99214 OFFICE O/P EST MOD 30 MIN: CPT | Performed by: NURSE PRACTITIONER

## 2024-02-02 PROCEDURE — 1160F RVW MEDS BY RX/DR IN RCRD: CPT | Performed by: NURSE PRACTITIONER

## 2024-02-02 RX ORDER — BUPROPION HYDROCHLORIDE 150 MG/1
150 TABLET ORAL DAILY
Qty: 90 TABLET | Refills: 0 | Status: SHIPPED | OUTPATIENT
Start: 2024-02-02

## 2024-02-02 RX ORDER — PREGABALIN 75 MG/1
1 CAPSULE ORAL EVERY 12 HOURS SCHEDULED
COMMUNITY
Start: 2024-01-18

## 2024-02-02 RX ORDER — CETIRIZINE HYDROCHLORIDE 10 MG/1
10 TABLET ORAL NIGHTLY
Qty: 90 TABLET | Refills: 3 | Status: SHIPPED | OUTPATIENT
Start: 2024-02-02

## 2024-02-02 RX ORDER — NORTRIPTYLINE HYDROCHLORIDE 10 MG/1
10 CAPSULE ORAL NIGHTLY PRN
COMMUNITY
Start: 2024-01-10

## 2024-02-02 NOTE — PROGRESS NOTES
SHERRY Marrufo  [facility]   Family Medicine  [address]  [place] [address]  Phone: [phone]  Fax: [phone]     Pallavi Singh is a 43 y.o. female.   : 1980    This visit is conducted today via video visit 2' covid-19 epidemic.  You have chosen to receive care through a telehealth visit.  Do you consent to use a video/audio connection for your medical care today? Yes    Pt located at Home and provider located at home office.     CC:   Chief Complaint   Patient presents with    Med Refill        HPI: Pallavi Singh is a 43 y.o. female that is an established patient. She  is here for evaluation of the above complaint.    Hypothyroidism: Patient reports recent TSH at [facility] that was within normal limits.  I have reviewed patient's recent labs performed at [facility].  TSH was not included.  Patient's other labs reviewed include her CMP which noted hyponatremia at 132.  Patient's CBC resulted with elevated platelets.    Skin changes: Patient reports mole to right side of face along with a derived bleeding lesion.  Patient reports that lesion is increasing in size.  She reports history of precancerous lesions removed at [facility] dermatology.        Depression/ADD: Patient reports she has been on Wellbutrin 150 mg.  Patient reports some blunting of emotions.  She states that she is numb at times.  She is unsure if this is related to the Wellbutrin or Strattera 40 mg.  Patient reports improvement in her ability to get up in the morning with this combination of medication.  She does report some issues with depression at times.  She is concerned about upcoming gastric sleeve surgery at [facility].  Patient denies insomnia.    The following portions of the patient's history were reviewed and updated as appropriate: allergies, current medications, past family history, past medical history, past social history, past  surgical history, and problem list.  Past Medical History:   Diagnosis Date    Allergic rhinitis     Anxiety     Chronic laryngitis     Chronic rhinitis     Chronic sinusitis     Depression     Deviated nasal septum     GERD (gastroesophageal reflux disease)     Globus sensation     Hypothyroidism      Family History   Problem Relation Age of Onset    Thyroid disease Mother     Arthritis Mother     Cancer Mother     Heart disease Mother     Obesity Mother     Diabetes Father     Hypertension Father     Heart disease Father     Cancer Paternal Grandfather      Social History     Socioeconomic History    Marital status: Single   Tobacco Use    Smoking status: Never     Passive exposure: Never    Smokeless tobacco: Never   Vaping Use    Vaping Use: Never used   Substance and Sexual Activity    Alcohol use: Yes     Comment: rare    Drug use: Never    Sexual activity: Defer     Review of Systems   Constitutional:  Negative for appetite change, diaphoresis, fatigue and fever.   HENT:  Negative for ear pain, hearing loss, mouth sores, sinus pressure, sneezing, sore throat and voice change.    Eyes:  Negative for discharge, itching and visual disturbance.   Respiratory:  Negative for cough, shortness of breath and wheezing.    Cardiovascular:  Negative for chest pain and palpitations.   Gastrointestinal:  Negative for abdominal pain, diarrhea and vomiting.   Musculoskeletal:  Negative for arthralgias, back pain and myalgias.   Skin:  Positive for skin lesions (facial lesion, bleeding, dry, mole changes). Negative for rash and wound.   Neurological:  Negative for dizziness, seizures, weakness, numbness and headache.   Hematological:  Negative for adenopathy. Does not bruise/bleed easily.   Psychiatric/Behavioral:  Negative for agitation, dysphoric mood, sleep disturbance and depressed mood. The patient is not nervous/anxious.      There were no vitals taken for this visit.  Physical Exam  Vitals and nursing note reviewed.    Constitutional:       Appearance: Normal appearance. She is well-developed.   HENT:      Head: Normocephalic and atraumatic.      Mouth/Throat:      Lips: Pink. No lesions.   Eyes:      General: Lids are normal. Vision grossly intact.      Conjunctiva/sclera: Conjunctivae normal.      Right eye: Right conjunctiva is not injected.      Left eye: Left conjunctiva is not injected.   Pulmonary:      Effort: Pulmonary effort is normal.   Musculoskeletal:         General: Normal range of motion.      Cervical back: Full passive range of motion without pain, normal range of motion and neck supple.   Skin:     General: Skin is dry.   Neurological:      Mental Status: She is alert and oriented to person, place, and time.      Motor: Motor function is intact.   Psychiatric:         Mood and Affect: Mood and affect normal.         Judgment: Judgment normal.           Assessment and Plan:   Diagnoses and all orders for this visit:    1. Precancerous skin lesion (Primary)  -     Ambulatory Referral to Dermatology    2. JENNIFER (generalized anxiety disorder)  -     buPROPion XL (Wellbutrin XL) 150 MG 24 hr tablet; Take 1 tablet by mouth Daily.  Dispense: 90 tablet; Refill: 0    3. Facial lesion  -     Ambulatory Referral to Dermatology    4. Hyponatremia    5. Allergic rhinitis due to other allergic trigger, unspecified seasonality  -     cetirizine (zyrTEC) 10 MG tablet; Take 1 tablet by mouth Every Night.  Dispense: 90 tablet; Refill: 3    6. Class 3 severe obesity due to excess calories with serious comorbidity and body mass index (BMI) of 50.0 to 59.9 in adult    7. Attention deficit disorder (ADD) without hyperactivity          Patient Instructions   Send picture of TSH performed at [facility].    Increase sodium within the diet to help increase sodium level noted to be low at recent labs performed at [facility].    Increase Wellbutrin to 300 mg daily to help with depression.  If you  note that this increases anxiety or causes adverse effects reduce back down to the 150 mg dosage.  If you do well on the 300 mg dose please send [employer] message and I will increase the milligrams of your tablet to 300 mg.    We will refer to dermatology for precancerous lesion of the face/mole changes.            Discussion:     I spent 32 minutes caring for Pallavi on this date of service. This time includes time spent by me in the following activities: preparing for the visit, reviewing tests, obtaining and/or reviewing a separately obtained history, performing a medically appropriate examination and/or evaluation, counseling and educating the patient/family/caregiver, ordering medications, tests, or procedures, referring and communicating with other health care professionals, documenting information in the medical record, independently interpreting results and communicating that information with the patient/family/caregiver, and care coordination     Follow up: March 1st.   Jenelle Fuentes, SHERRY  2/2/2024  10:41 CST

## 2024-02-02 NOTE — PATIENT INSTRUCTIONS
Send picture of TSH performed at [facility].    Increase sodium within the diet to help increase sodium level noted to be low at recent labs performed at [facility].    Increase Wellbutrin to 300 mg daily to help with depression.  If you note that this increases anxiety or causes adverse effects reduce back down to the 150 mg dosage.  If you do well on the 300 mg dose please send [employer] message and I will increase the milligrams of your tablet to 300 mg.    We will refer to dermatology for precancerous lesion of the face/mole changes.

## 2024-04-05 DIAGNOSIS — F90.9 ATTENTION DEFICIT HYPERACTIVITY DISORDER (ADHD), UNSPECIFIED ADHD TYPE: ICD-10-CM

## 2024-04-05 RX ORDER — ATOMOXETINE 40 MG/1
40 CAPSULE ORAL DAILY
Qty: 30 CAPSULE | Refills: 2 | Status: SHIPPED | OUTPATIENT
Start: 2024-04-05

## 2024-04-05 NOTE — TELEPHONE ENCOUNTER
Rx Refill Note  Requested Prescriptions     Pending Prescriptions Disp Refills    atomoxetine (STRATTERA) 40 MG capsule [Pharmacy Med Name: ATOMOXETINE 40MG CAPSULES] 30 capsule 2     Sig: TAKE 1 CAPSULE BY MOUTH DAILY      Last office visit with prescribing clinician: 9/15/2023   Last telemedicine visit with prescribing clinician: 2/2/2024   Next office visit with prescribing clinician: Visit date not found                         Would you like a call back once the refill request has been completed: [] Yes [] No    If the office needs to give you a call back, can they leave a voicemail: [] Yes [] No    Pallavi Alex MA  04/05/24, 09:43 CDT

## 2024-04-16 DIAGNOSIS — F41.1 GAD (GENERALIZED ANXIETY DISORDER): ICD-10-CM

## 2024-04-16 RX ORDER — BUPROPION HYDROCHLORIDE 150 MG/1
150 TABLET ORAL DAILY
Qty: 90 TABLET | Refills: 0 | OUTPATIENT
Start: 2024-04-16

## 2024-04-18 ENCOUNTER — OFFICE VISIT (OUTPATIENT)
Dept: FAMILY MEDICINE CLINIC | Facility: CLINIC | Age: 44
End: 2024-04-18
Payer: MEDICAID

## 2024-04-18 VITALS
OXYGEN SATURATION: 99 % | WEIGHT: 263 LBS | DIASTOLIC BLOOD PRESSURE: 68 MMHG | HEART RATE: 56 BPM | HEIGHT: 65 IN | SYSTOLIC BLOOD PRESSURE: 118 MMHG | RESPIRATION RATE: 18 BRPM | TEMPERATURE: 97.5 F | BODY MASS INDEX: 43.82 KG/M2

## 2024-04-18 DIAGNOSIS — R00.2 PALPITATIONS: ICD-10-CM

## 2024-04-18 DIAGNOSIS — S03.00XD DISLOCATION OF TEMPOROMANDIBULAR JOINT, SUBSEQUENT ENCOUNTER: ICD-10-CM

## 2024-04-18 DIAGNOSIS — R00.0 TACHYCARDIA: ICD-10-CM

## 2024-04-18 DIAGNOSIS — F51.01 PRIMARY INSOMNIA: Primary | ICD-10-CM

## 2024-04-18 DIAGNOSIS — F32.A DEPRESSION, UNSPECIFIED DEPRESSION TYPE: ICD-10-CM

## 2024-04-18 RX ORDER — BUPROPION HYDROCHLORIDE 300 MG/1
300 TABLET ORAL DAILY
Qty: 90 TABLET | Refills: 1 | Status: SHIPPED | OUTPATIENT
Start: 2024-04-18

## 2024-04-18 RX ORDER — TRAZODONE HYDROCHLORIDE 50 MG/1
50 TABLET ORAL NIGHTLY
Qty: 30 TABLET | Refills: 2 | Status: SHIPPED | OUTPATIENT
Start: 2024-04-18

## 2024-04-18 NOTE — PROGRESS NOTES
SHERRY Griffin  [facility]   Family Medicine  [address]  [place] [address]  Phone: [phone]  Fax: [phone]         Chief Complaint:  Chief Complaint   Patient presents with    Neck Pain    Insomnia        History:  Pallavi Singh is a 43 y.o. female that is an established patient. She  is here for evaluation of the above complaint and management of chronic conditions.    HPI     Pallavi is here in return, continues with neck pain s/p artificial disc replacement per Dr. Hernandez 05/2023. She also has TMJ with right jaw pain. Muscle relaxers in the past haven't been helpful.     She has had insomnia, little help with nortriptyline. She wakes up often with changing positions for neck pain.    She states she's had tachycardia for quite awhile. She monitors her HR on her smart watch and states it increases quickly when she gets up from a sitting position. HR today is 56 bpm. She notices some palpitations. She does have anxiety. Depression is better on the increase dose of Wellbutrin.           ROS:  Review of Systems   Constitutional: Negative.    HENT: Negative.     Respiratory: Negative.     Cardiovascular:  Positive for palpitations.   Gastrointestinal: Negative.    Musculoskeletal:  Positive for neck pain and neck stiffness.        Jaw pain   Psychiatric/Behavioral:  The patient is nervous/anxious.          reports that she has never smoked. She has never been exposed to tobacco smoke. She has never used smokeless tobacco. She reports current alcohol use. She reports that she does not use drugs.    Current Outpatient Medications   Medication Instructions    atomoxetine (STRATTERA) 40 mg, Oral, Daily    buPROPion XL (WELLBUTRIN XL) 300 mg, Oral, Daily    cetirizine (ZYRTEC) 10 mg, Oral, Nightly    fluticasone (FLONASE) 50 MCG/ACT nasal spray 2 sprays, Each Nare, Daily    hydrOXYzine (ATARAX) 25 mg, Oral, Every 8 Hours PRN    levothyroxine (SYNTHROID, LEVOTHROID) 75 mcg, Oral,  "Daily    norethindrone (MICRONOR) 0.35 mg, Oral, Daily    omeprazole (PRILOSEC) 40 mg, Oral, Daily    pregabalin (LYRICA) 75 MG capsule 1 capsule, Oral, Every 12 Hours Scheduled    traZODone (DESYREL) 50 mg, Oral, Nightly       OBJECTIVE:  /68   Pulse 56   Temp 97.5 °F (36.4 °C) (Temporal)   Resp 18   Ht 165.1 cm (65\")   Wt 119 kg (263 lb)   SpO2 99%   BMI 43.77 kg/m²    Physical Exam  Vitals and nursing note reviewed.   Constitutional:       Appearance: Normal appearance.   HENT:      Head: Normocephalic and atraumatic.      Right Ear: Tympanic membrane, ear canal and external ear normal.      Left Ear: Tympanic membrane, ear canal and external ear normal.      Nose: Nose normal.      Mouth/Throat:      Pharynx: Oropharynx is clear.   Eyes:      Conjunctiva/sclera: Conjunctivae normal.   Neck:      Vascular: No carotid bruit or JVD.        Comments: stiffness  Cardiovascular:      Rate and Rhythm: Normal rate and regular rhythm.   Pulmonary:      Effort: Pulmonary effort is normal.      Breath sounds: Normal breath sounds.   Neurological:      General: No focal deficit present.      Mental Status: She is alert and oriented to person, place, and time.   Psychiatric:         Mood and Affect: Mood normal.         Behavior: Behavior normal.         Procedures    Assessment/Plan:     Diagnoses and all orders for this visit:    1. Primary insomnia (Primary)  -     traZODone (DESYREL) 50 MG tablet; Take 1 tablet by mouth Every Night.  Dispense: 30 tablet; Refill: 2    2. Palpitations  -     Holter Monitor - 72 Hour Up To 15 Days; Future    3. Tachycardia  -     Holter Monitor - 72 Hour Up To 15 Days; Future    4. Depression, unspecified depression type  -     buPROPion XL (Wellbutrin XL) 300 MG 24 hr tablet; Take 1 tablet by mouth Daily.  Dispense: 90 tablet; Refill: 1    5. Dislocation of temporomandibular joint, subsequent encounter             An After Visit Summary was printed and given to the patient at " discharge.  Return in about 4 weeks (around 5/16/2024) for Video visit, on a wednesday morning.       Patient Instructions   Trazodone 50 mg about an hour before bedtime  Holter monitor  Discontinue nortriptaline      Discussion:     I spent 32 minutes caring for Pallavi on this date of service. This time includes time spent by me in the following activities: preparing for the visit, reviewing tests, obtaining and/or reviewing a separately obtained history, performing a medically appropriate examination and/or evaluation, counseling and educating the patient/family/caregiver, ordering medications, tests, or procedures, documenting information in the medical record, and independently interpreting results and communicating that information with the patient/family/caregiver     Albina POWELL 4/18/2024   Electronically signed.

## 2024-05-14 ENCOUNTER — HOSPITAL ENCOUNTER (OUTPATIENT)
Dept: CARDIOLOGY | Facility: HOSPITAL | Age: 44
Discharge: HOME | End: 2024-05-14
Admitting: NURSE PRACTITIONER
Payer: MEDICAID

## 2024-05-14 DIAGNOSIS — R00.0 TACHYCARDIA: ICD-10-CM

## 2024-05-14 DIAGNOSIS — R00.2 PALPITATIONS: ICD-10-CM

## 2024-05-14 PROCEDURE — 93246 EXT ECG>7D<15D RECORDING: CPT

## 2024-05-15 ENCOUNTER — TELEMEDICINE (OUTPATIENT)
Dept: FAMILY MEDICINE CLINIC | Facility: CLINIC | Age: 44
End: 2024-05-15
Payer: MEDICAID

## 2024-05-15 VITALS — HEIGHT: 65 IN | BODY MASS INDEX: 43.77 KG/M2

## 2024-05-15 DIAGNOSIS — Z90.3 S/P GASTRIC SLEEVE PROCEDURE: ICD-10-CM

## 2024-05-15 DIAGNOSIS — K59.09 OTHER CONSTIPATION: ICD-10-CM

## 2024-05-15 DIAGNOSIS — F51.01 PRIMARY INSOMNIA: ICD-10-CM

## 2024-05-15 DIAGNOSIS — F41.1 GAD (GENERALIZED ANXIETY DISORDER): ICD-10-CM

## 2024-05-15 DIAGNOSIS — I95.1 ORTHOSTATIC HYPOTENSION: ICD-10-CM

## 2024-05-15 DIAGNOSIS — F32.A DEPRESSION, UNSPECIFIED DEPRESSION TYPE: ICD-10-CM

## 2024-05-15 DIAGNOSIS — Z12.31 ENCOUNTER FOR SCREENING MAMMOGRAM FOR MALIGNANT NEOPLASM OF BREAST: ICD-10-CM

## 2024-05-15 DIAGNOSIS — R00.0 TACHYCARDIA: Primary | ICD-10-CM

## 2024-05-15 PROCEDURE — 99214 OFFICE O/P EST MOD 30 MIN: CPT | Performed by: NURSE PRACTITIONER

## 2024-05-15 RX ORDER — QUETIAPINE FUMARATE 50 MG/1
50 TABLET, EXTENDED RELEASE ORAL NIGHTLY
Qty: 30 EACH | Refills: 1 | Status: SHIPPED | OUTPATIENT
Start: 2024-05-15

## 2024-05-15 NOTE — PROGRESS NOTES
SHERRY Griffin  [facility]   Family Medicine  [address]  [place] [address]  Phone: [phone]  Fax: [phone]     Pallavi Singh is a 43 y.o. female.   : 1980    This visit is conducted today via video visit.  You have chosen to receive care through a telehealth visit.  Do you consent to use a video/audio connection for your medical care today? YES    Pt located at Home and provider located at work office.     CC:   Chief Complaint   Patient presents with    Primary insomnia     Follow up        HPI: Pallavi Singh is a 43 y.o. female that is an established patient. She  is here for evaluation of the above complaint and management of chronic conditions.    She got the Holter monitor placed yesterday, still notices tachycardia when standing from a sitting position. She also notices some orthostatic hypotension.    She had lab work in [place] by her bariatric provider. She is s/p gastric sleeve procedure . Vitamin B 12 level is high, CMP, TSH and CBC are wnl.     She did well on the Trazodone the first week, but now feels like it is giving her a hangover when waking up.    She has had constipation since the gastric sleeve, uses Miralax once/day. We discussed adding linzess, if needed.    The following portions of the patient's history were reviewed and updated as appropriate: allergies, current medications, past family history, past medical history, past social history, past surgical history, and problem list.  Past Medical History:   Diagnosis Date    Allergic rhinitis     Anxiety     Chronic laryngitis     Chronic rhinitis     Chronic sinusitis     Depression     Deviated nasal septum     GERD (gastroesophageal reflux disease)     Globus sensation     Hypothyroidism      Family History   Problem Relation Age of Onset    Thyroid disease Mother     Arthritis Mother     Cancer Mother     Heart disease Mother     Obesity Mother     Diabetes Father     Hypertension  "Father     Heart disease Father     Cancer Paternal Grandfather      Social History     Socioeconomic History    Marital status: Single   Tobacco Use    Smoking status: Never     Passive exposure: Never    Smokeless tobacco: Never   Vaping Use    Vaping status: Never Used   Substance and Sexual Activity    Alcohol use: Yes     Comment: rare    Drug use: Never    Sexual activity: Defer     Review of Systems   Constitutional:  Positive for fatigue.   Respiratory: Negative.     Cardiovascular: Negative.    Gastrointestinal:  Positive for constipation.        Tachycardia   Neurological:  Positive for dizziness.     Ht 165.1 cm (65\")   BMI 43.77 kg/m²   Physical Exam  Vitals and nursing note reviewed.   Constitutional:       Appearance: Normal appearance.   HENT:      Head: Normocephalic and atraumatic.      Nose: Nose normal.   Eyes:      Conjunctiva/sclera: Conjunctivae normal.   Pulmonary:      Effort: Pulmonary effort is normal.   Neurological:      General: No focal deficit present.      Mental Status: She is alert and oriented to person, place, and time.   Psychiatric:         Mood and Affect: Mood normal.         Behavior: Behavior normal.           Assessment and Plan:   Diagnoses and all orders for this visit:    1. Tachycardia (Primary)    2. Encounter for screening mammogram for malignant neoplasm of breast  -     Mammo Screening Digital Tomosynthesis Bilateral With CAD; Future    3. Primary insomnia  -     QUEtiapine fumarate ER (SEROquel XR) 50 MG tablet sustained-release 24 hour tablet; Take 1 tablet by mouth Every Night.  Dispense: 30 each; Refill: 1    4. JENNIFER (generalized anxiety disorder)  -     QUEtiapine fumarate ER (SEROquel XR) 50 MG tablet sustained-release 24 hour tablet; Take 1 tablet by mouth Every Night.  Dispense: 30 each; Refill: 1    5. Depression, unspecified depression type    6. S/P gastric sleeve procedure    7. Orthostatic hypotension    8. Other constipation    -increase Miralax to BID " for a few days      Patient Instructions   D/c Trazodone  Start Seroquel at bedtime  Increase Miralax to twice a day for a few days    Discussion:     I spent 25 minutes caring for Pallavi on this date of service. This time includes time spent by me in the following activities: preparing for the visit, reviewing tests, obtaining and/or reviewing a separately obtained history, performing a medically appropriate examination and/or evaluation, counseling and educating the patient/family/caregiver, ordering medications, tests, or procedures, documenting information in the medical record, and independently interpreting results and communicating that information with the patient/family/caregiver     Follow up: 2 months  SHERRY Griffin  5/15/2024  12:03 CDT

## 2024-05-16 ENCOUNTER — OFFICE VISIT (OUTPATIENT)
Dept: OBGYN CLINIC | Age: 44
End: 2024-05-16
Payer: MEDICAID

## 2024-05-16 VITALS
BODY MASS INDEX: 42.65 KG/M2 | HEIGHT: 65 IN | HEART RATE: 84 BPM | WEIGHT: 256 LBS | SYSTOLIC BLOOD PRESSURE: 125 MMHG | DIASTOLIC BLOOD PRESSURE: 86 MMHG

## 2024-05-16 DIAGNOSIS — N63.0 BREAST NODULE: ICD-10-CM

## 2024-05-16 DIAGNOSIS — Z12.4 CERVICAL CANCER SCREENING: ICD-10-CM

## 2024-05-16 DIAGNOSIS — N93.9 ABNORMAL UTERINE BLEEDING (AUB): Primary | ICD-10-CM

## 2024-05-16 DIAGNOSIS — Z01.419 ENCOUNTER FOR CERVICAL PAP SMEAR WITH PELVIC EXAM: ICD-10-CM

## 2024-05-16 DIAGNOSIS — Z12.31 ENCOUNTER FOR SCREENING MAMMOGRAM FOR MALIGNANT NEOPLASM OF BREAST: ICD-10-CM

## 2024-05-16 DIAGNOSIS — N89.8 VAGINAL ITCHING: ICD-10-CM

## 2024-05-16 DIAGNOSIS — Z11.51 ENCOUNTER FOR SCREENING FOR HUMAN PAPILLOMAVIRUS (HPV): ICD-10-CM

## 2024-05-16 LAB — HPV16+18+H RISK 12 DNA SPEC-IMP: NORMAL

## 2024-05-16 PROCEDURE — 99396 PREV VISIT EST AGE 40-64: CPT | Performed by: ADVANCED PRACTICE MIDWIFE

## 2024-05-16 PROCEDURE — 99214 OFFICE O/P EST MOD 30 MIN: CPT | Performed by: ADVANCED PRACTICE MIDWIFE

## 2024-05-16 RX ORDER — CLOTRIMAZOLE AND BETAMETHASONE DIPROPIONATE 10; .64 MG/G; MG/G
CREAM TOPICAL
Qty: 1 EACH | Refills: 1 | Status: SHIPPED | OUTPATIENT
Start: 2024-05-16

## 2024-05-16 RX ORDER — PREGABALIN 75 MG/1
75 CAPSULE ORAL EVERY 12 HOURS SCHEDULED
COMMUNITY
Start: 2024-01-18

## 2024-05-16 NOTE — PATIENT INSTRUCTIONS
feel good, have more energy, and stay at a weight that's healthy for you. You can share a healthy lifestyle with your friends and family. And you can do it on your own.    Eat meals with your friends or family. You could try cooking together.    Plan activities with other people. Go for a walk with a friend, try a free online fitness class, or join a sports league.    Eat a variety of healthy foods. These include fruits, vegetables, whole grains, low-fat dairy, and lean protein.    Choose healthy portions of food. You can use the Nutrition Facts label on food packages as a guide.    Eat more fruits and vegetables. You could add vegetables to sandwiches or add fruit to cereal.    Drink water when you are thirsty. Limit soda, juice, and sports drinks.    Try to exercise most days. Aim for at least 2½ hours of exercise each week.    Keep moving. Work in the garden or take your dog on a walk. Use the stairs instead of the elevator.    If you use tobacco or nicotine, try to quit. Ask your doctor about programs and medicines to help you quit.    Limit alcohol. Men should have no more than 2 drinks a day. Women should have no more than 1. For some people, no alcohol is the best choice.  Follow-up care is a key part of your treatment and safety. Be sure to make and go to all appointments, and call your doctor if you are having problems. It's also a good idea to know your test results and keep a list of the medicines you take.  Where can you learn more?  Go to https://www.[employer].net/patientEd and enter U807 to learn more about \"A Healthy Lifestyle: Care Instructions.\"  Current as of: August 6, 2023               Content Version: 14.0  © 7379-0797 [employer].   Care instructions adapted under license by [employer]. If you have questions about a medical condition or this instruction, always ask your healthcare professional. [employer] disclaims any warranty or liability for your use of this

## 2024-05-16 NOTE — PROGRESS NOTES
Pt presents today for pap smear and breast exam.     Last mammogram:  never   Last pap smear:  10/2022- negative   Contraception:  ocp  :  0  Para:  0  AB:  0  Last bone density:  never   Last colonoscopy: never     
Effort: Pulmonary effort is normal.      Breath sounds: Normal breath sounds.   Abdominal:      General: Abdomen is flat.      Palpations: Abdomen is soft.   Musculoskeletal:         General: Normal range of motion.   Lymphadenopathy:      Upper Body:      Right upper body: No supraclavicular or axillary adenopathy.      Left upper body: No supraclavicular or axillary adenopathy.   Neurological:      General: No focal deficit present.      Mental Status: She is alert and oriented to person, place, and time.   Skin:     General: Skin is warm and dry.      Capillary Refill: Capillary refill takes less than 2 seconds.   Psychiatric:         Mood and Affect: Mood normal.         Behavior: Behavior normal.         Thought Content: Thought content normal.         Judgment: Judgment normal.   Vitals and nursing note reviewed.          MEDICATIONS:  Orders Placed This Encounter   Medications    clotrimazole-betamethasone (LOTRISONE) 1-0.05 % cream     Sig: Apply topically 2 times daily.     Dispense:  1 each     Refill:  1       ORDERS:  Orders Placed This Encounter   Procedures    US NON OB TRANSVAGINAL    UGO DIGITAL SCREEN W OR WO CAD BILATERAL    UGO DIGITAL DIAGNOSTIC UNILATERAL RIGHT    PAP SMEAR    Human papillomavirus (HPV) DNA probe thin prep high risk       PLAN:  1. Abnormal uterine bleeding (AUB)  -     US NON OB TRANSVAGINAL; Future  2. Encounter for cervical Pap smear with pelvic exam  -     PAP SMEAR  3. Encounter for screening for human papillomavirus (HPV)  -     Human papillomavirus (HPV) DNA probe thin prep high risk  4. Cervical cancer screening  -     PAP SMEAR  -     Human papillomavirus (HPV) DNA probe thin prep high risk  5. Encounter for screening mammogram for malignant neoplasm of breast  -     UGO DIGITAL SCREEN W OR WO CAD BILATERAL; Future  6. Breast nodule  -     UGO DIGITAL DIAGNOSTIC UNILATERAL RIGHT; Future  7. Vaginal itching  -     clotrimazole-betamethasone (LOTRISONE) 1-0.05 % cream;

## 2024-05-17 ENCOUNTER — TELEPHONE (OUTPATIENT)
Dept: OBGYN CLINIC | Age: 44
End: 2024-05-17

## 2024-05-17 DIAGNOSIS — N89.8 VAGINAL ITCHING: Primary | ICD-10-CM

## 2024-05-17 DIAGNOSIS — N63.0 BREAST NODULE: Primary | ICD-10-CM

## 2024-05-17 NOTE — TELEPHONE ENCOUNTER
Central scheduling called and needed new orders for the mammo/us for the [facility]. I got those put in for patient. Laurie CARRASQUILLO

## 2024-05-23 LAB
HPV HR 12 DNA SPEC QL NAA+PROBE: NOT DETECTED
HPV16 DNA SPEC QL NAA+PROBE: NOT DETECTED
HPV16+18+H RISK 12 DNA SPEC-IMP: NORMAL
HPV18 DNA SPEC QL NAA+PROBE: NOT DETECTED

## 2024-06-11 DIAGNOSIS — R05.9 COUGH, UNSPECIFIED TYPE: ICD-10-CM

## 2024-06-11 DIAGNOSIS — E66.01 CLASS 3 SEVERE OBESITY DUE TO EXCESS CALORIES WITH SERIOUS COMORBIDITY AND BODY MASS INDEX (BMI) OF 50.0 TO 59.9 IN ADULT: ICD-10-CM

## 2024-06-11 RX ORDER — HYDROXYZINE HYDROCHLORIDE 25 MG/1
TABLET, FILM COATED ORAL
Qty: 90 TABLET | Refills: 3 | OUTPATIENT
Start: 2024-06-11

## 2024-06-11 RX ORDER — FLUTICASONE PROPIONATE 50 MCG
2 SPRAY, SUSPENSION (ML) NASAL DAILY
Qty: 16 G | Refills: 2 | OUTPATIENT
Start: 2024-06-11

## 2024-06-11 RX ORDER — LEVOTHYROXINE SODIUM 0.07 MG/1
75 TABLET ORAL DAILY
Qty: 90 TABLET | Refills: 0 | OUTPATIENT
Start: 2024-06-11

## 2024-07-02 DIAGNOSIS — E66.01 CLASS 3 SEVERE OBESITY DUE TO EXCESS CALORIES WITH SERIOUS COMORBIDITY AND BODY MASS INDEX (BMI) OF 50.0 TO 59.9 IN ADULT: ICD-10-CM

## 2024-07-02 DIAGNOSIS — F90.9 ATTENTION DEFICIT HYPERACTIVITY DISORDER (ADHD), UNSPECIFIED ADHD TYPE: ICD-10-CM

## 2024-07-02 RX ORDER — ATOMOXETINE 40 MG/1
40 CAPSULE ORAL DAILY
Qty: 30 CAPSULE | Refills: 2 | OUTPATIENT
Start: 2024-07-02

## 2024-07-02 RX ORDER — LEVOTHYROXINE SODIUM 0.07 MG/1
75 TABLET ORAL DAILY
Qty: 90 TABLET | Refills: 0 | OUTPATIENT
Start: 2024-07-02

## 2024-07-03 ENCOUNTER — TELEPHONE (OUTPATIENT)
Dept: FAMILY MEDICINE CLINIC | Facility: CLINIC | Age: 44
End: 2024-07-03
Payer: MEDICAID

## 2024-07-03 ENCOUNTER — HOSPITAL ENCOUNTER (OUTPATIENT)
Dept: WOMENS IMAGING | Age: 44
Discharge: HOME | End: 2024-07-03
Attending: ADVANCED PRACTICE MIDWIFE
Payer: MEDICAID

## 2024-07-03 ENCOUNTER — HOSPITAL ENCOUNTER (OUTPATIENT)
Dept: ULTRASOUND IMAGING | Age: 44
Discharge: HOME | End: 2024-07-03
Attending: ADVANCED PRACTICE MIDWIFE
Payer: MEDICAID

## 2024-07-03 VITALS — BODY MASS INDEX: 40.77 KG/M2 | WEIGHT: 245 LBS

## 2024-07-03 DIAGNOSIS — E66.01 CLASS 3 SEVERE OBESITY DUE TO EXCESS CALORIES WITH SERIOUS COMORBIDITY AND BODY MASS INDEX (BMI) OF 50.0 TO 59.9 IN ADULT: ICD-10-CM

## 2024-07-03 DIAGNOSIS — N63.0 BREAST NODULE: ICD-10-CM

## 2024-07-03 DIAGNOSIS — N93.9 ABNORMAL UTERINE BLEEDING (AUB): ICD-10-CM

## 2024-07-03 DIAGNOSIS — F90.9 ATTENTION DEFICIT HYPERACTIVITY DISORDER (ADHD), UNSPECIFIED ADHD TYPE: ICD-10-CM

## 2024-07-03 PROCEDURE — 76830 TRANSVAGINAL US NON-OB: CPT

## 2024-07-03 PROCEDURE — 76642 ULTRASOUND BREAST LIMITED: CPT

## 2024-07-03 PROCEDURE — G0279 TOMOSYNTHESIS, MAMMO: HCPCS

## 2024-07-03 RX ORDER — ATOMOXETINE 40 MG/1
40 CAPSULE ORAL DAILY
Qty: 30 CAPSULE | Refills: 2 | Status: SHIPPED | OUTPATIENT
Start: 2024-07-03

## 2024-07-03 RX ORDER — LEVOTHYROXINE SODIUM 0.07 MG/1
75 TABLET ORAL DAILY
Qty: 90 TABLET | Refills: 0 | Status: SHIPPED | OUTPATIENT
Start: 2024-07-03

## 2024-07-03 NOTE — TELEPHONE ENCOUNTER
PATIENT CALLED REQUESTING MED REFILL ALSO SENT MESSAGE THRU MY CHART WANTING TO MAKE SURE THAT WE REFILLED HER MEDS BEFORE THE HOLIDAY SINCE WE ARE CLOSED.

## 2024-07-10 DIAGNOSIS — F51.01 PRIMARY INSOMNIA: ICD-10-CM

## 2024-07-10 RX ORDER — TRAZODONE HYDROCHLORIDE 50 MG/1
50 TABLET ORAL NIGHTLY
Qty: 30 TABLET | Refills: 2 | Status: SHIPPED | OUTPATIENT
Start: 2024-07-10

## 2024-08-13 ENCOUNTER — TRANSCRIBE ORDERS (OUTPATIENT)
Dept: ADMINISTRATIVE | Facility: HOSPITAL | Age: 44
End: 2024-08-13
Payer: MEDICAID

## 2024-08-13 DIAGNOSIS — E55.9 VITAMIN D DEFICIENCY, UNSPECIFIED: ICD-10-CM

## 2024-08-13 DIAGNOSIS — E56.9 VITAMIN DEFICIENCY, UNSPECIFIED: ICD-10-CM

## 2024-08-13 DIAGNOSIS — D50.9 IRON DEFICIENCY ANEMIA, UNSPECIFIED IRON DEFICIENCY ANEMIA TYPE: Primary | ICD-10-CM

## 2024-08-13 DIAGNOSIS — D51.9 ANEMIA DUE TO VITAMIN B12 DEFICIENCY, UNSPECIFIED B12 DEFICIENCY TYPE: ICD-10-CM

## 2024-09-04 ENCOUNTER — LAB (OUTPATIENT)
Dept: LAB | Facility: HOSPITAL | Age: 44
End: 2024-09-04
Payer: MEDICAID

## 2024-09-04 DIAGNOSIS — D50.9 IRON DEFICIENCY ANEMIA, UNSPECIFIED IRON DEFICIENCY ANEMIA TYPE: ICD-10-CM

## 2024-09-04 DIAGNOSIS — D51.9 ANEMIA DUE TO VITAMIN B12 DEFICIENCY, UNSPECIFIED B12 DEFICIENCY TYPE: ICD-10-CM

## 2024-09-04 DIAGNOSIS — E56.9 VITAMIN DEFICIENCY, UNSPECIFIED: ICD-10-CM

## 2024-09-04 DIAGNOSIS — E55.9 VITAMIN D DEFICIENCY, UNSPECIFIED: ICD-10-CM

## 2024-09-04 LAB
25(OH)D3 SERPL-MCNC: 45.9 NG/ML (ref 30–100)
ALBUMIN SERPL-MCNC: 4.1 G/DL (ref 3.5–5.2)
ALBUMIN/GLOB SERPL: 1.4 G/DL
ALP SERPL-CCNC: 93 U/L (ref 39–117)
ALT SERPL W P-5'-P-CCNC: 14 U/L (ref 1–33)
ANION GAP SERPL CALCULATED.3IONS-SCNC: 10 MMOL/L (ref 5–15)
AST SERPL-CCNC: 17 U/L (ref 1–32)
BASOPHILS # BLD AUTO: 0.07 10*3/MM3 (ref 0–0.2)
BASOPHILS NFR BLD AUTO: 0.8 % (ref 0–1.5)
BILIRUB SERPL-MCNC: 0.5 MG/DL (ref 0–1.2)
BUN SERPL-MCNC: 9 MG/DL (ref 6–20)
BUN/CREAT SERPL: 11.5 (ref 7–25)
CALCIUM SPEC-SCNC: 8.4 MG/DL (ref 8.6–10.5)
CHLORIDE SERPL-SCNC: 104 MMOL/L (ref 98–107)
CO2 SERPL-SCNC: 23 MMOL/L (ref 22–29)
CREAT SERPL-MCNC: 0.78 MG/DL (ref 0.57–1)
DEPRECATED RDW RBC AUTO: 45 FL (ref 37–54)
EGFRCR SERPLBLD CKD-EPI 2021: 97.4 ML/MIN/1.73
EOSINOPHIL # BLD AUTO: 0.21 10*3/MM3 (ref 0–0.4)
EOSINOPHIL NFR BLD AUTO: 2.3 % (ref 0.3–6.2)
ERYTHROCYTE [DISTWIDTH] IN BLOOD BY AUTOMATED COUNT: 13.2 % (ref 12.3–15.4)
FERRITIN SERPL-MCNC: 114.8 NG/ML (ref 13–150)
GLOBULIN UR ELPH-MCNC: 3 GM/DL
GLUCOSE SERPL-MCNC: 96 MG/DL (ref 65–99)
HCT VFR BLD AUTO: 43.4 % (ref 34–46.6)
HGB BLD-MCNC: 14.3 G/DL (ref 12–15.9)
IMM GRANULOCYTES # BLD AUTO: 0.02 10*3/MM3 (ref 0–0.05)
IMM GRANULOCYTES NFR BLD AUTO: 0.2 % (ref 0–0.5)
IRON 24H UR-MRATE: 79 MCG/DL (ref 37–145)
LYMPHOCYTES # BLD AUTO: 3.15 10*3/MM3 (ref 0.7–3.1)
LYMPHOCYTES NFR BLD AUTO: 34.8 % (ref 19.6–45.3)
MCH RBC QN AUTO: 30.4 PG (ref 26.6–33)
MCHC RBC AUTO-ENTMCNC: 32.9 G/DL (ref 31.5–35.7)
MCV RBC AUTO: 92.1 FL (ref 79–97)
MONOCYTES # BLD AUTO: 0.69 10*3/MM3 (ref 0.1–0.9)
MONOCYTES NFR BLD AUTO: 7.6 % (ref 5–12)
NEUTROPHILS NFR BLD AUTO: 4.9 10*3/MM3 (ref 1.7–7)
NEUTROPHILS NFR BLD AUTO: 54.3 % (ref 42.7–76)
NRBC BLD AUTO-RTO: 0 /100 WBC (ref 0–0.2)
PLATELET # BLD AUTO: 441 10*3/MM3 (ref 140–450)
PMV BLD AUTO: 8.7 FL (ref 6–12)
POTASSIUM SERPL-SCNC: 3.6 MMOL/L (ref 3.5–5.2)
PROT SERPL-MCNC: 7.1 G/DL (ref 6–8.5)
RBC # BLD AUTO: 4.71 10*6/MM3 (ref 3.77–5.28)
SODIUM SERPL-SCNC: 137 MMOL/L (ref 136–145)
VIT B12 BLD-MCNC: 657 PG/ML (ref 211–946)
WBC NRBC COR # BLD AUTO: 9.04 10*3/MM3 (ref 3.4–10.8)

## 2024-09-04 PROCEDURE — 80053 COMPREHEN METABOLIC PANEL: CPT

## 2024-09-04 PROCEDURE — 82728 ASSAY OF FERRITIN: CPT

## 2024-09-04 PROCEDURE — 83540 ASSAY OF IRON: CPT

## 2024-09-04 PROCEDURE — 82306 VITAMIN D 25 HYDROXY: CPT

## 2024-09-04 PROCEDURE — 36415 COLL VENOUS BLD VENIPUNCTURE: CPT

## 2024-09-04 PROCEDURE — 82607 VITAMIN B-12: CPT

## 2024-09-04 PROCEDURE — 84425 ASSAY OF VITAMIN B-1: CPT

## 2024-09-04 PROCEDURE — 85025 COMPLETE CBC W/AUTO DIFF WBC: CPT

## 2024-09-07 LAB — VIT B1 BLD-SCNC: 121.8 NMOL/L (ref 66.5–200)

## 2024-09-24 ENCOUNTER — TELEMEDICINE (OUTPATIENT)
Dept: FAMILY MEDICINE CLINIC | Facility: CLINIC | Age: 44
End: 2024-09-24
Payer: MEDICAID

## 2024-09-24 VITALS — HEIGHT: 65 IN | WEIGHT: 235 LBS | BODY MASS INDEX: 39.15 KG/M2

## 2024-09-24 DIAGNOSIS — F39 MOOD DISORDER: ICD-10-CM

## 2024-09-24 DIAGNOSIS — R68.89 COLD SENSITIVITY: Primary | ICD-10-CM

## 2024-09-24 DIAGNOSIS — E66.01 CLASS 3 SEVERE OBESITY DUE TO EXCESS CALORIES WITH SERIOUS COMORBIDITY AND BODY MASS INDEX (BMI) OF 50.0 TO 59.9 IN ADULT: ICD-10-CM

## 2024-09-24 DIAGNOSIS — F90.9 ATTENTION DEFICIT HYPERACTIVITY DISORDER (ADHD), UNSPECIFIED ADHD TYPE: ICD-10-CM

## 2024-09-24 DIAGNOSIS — K21.9 GASTROESOPHAGEAL REFLUX DISEASE, UNSPECIFIED WHETHER ESOPHAGITIS PRESENT: ICD-10-CM

## 2024-09-24 DIAGNOSIS — F51.01 PRIMARY INSOMNIA: ICD-10-CM

## 2024-09-24 PROBLEM — Z90.3 H/O GASTRIC SLEEVE: Status: ACTIVE | Noted: 2024-09-24

## 2024-09-24 PROBLEM — E03.8 OTHER SPECIFIED HYPOTHYROIDISM: Status: ACTIVE | Noted: 2024-09-24

## 2024-09-24 PROCEDURE — 1160F RVW MEDS BY RX/DR IN RCRD: CPT

## 2024-09-24 PROCEDURE — 1159F MED LIST DOCD IN RCRD: CPT

## 2024-09-24 PROCEDURE — 99214 OFFICE O/P EST MOD 30 MIN: CPT

## 2024-09-24 PROCEDURE — 1125F AMNT PAIN NOTED PAIN PRSNT: CPT

## 2024-09-24 RX ORDER — BUPROPION HYDROCHLORIDE 200 MG/1
200 TABLET, EXTENDED RELEASE ORAL 2 TIMES DAILY
Qty: 180 TABLET | Refills: 0 | Status: SHIPPED | OUTPATIENT
Start: 2024-09-24

## 2024-09-24 RX ORDER — PREGABALIN 75 MG/1
75 CAPSULE ORAL EVERY 12 HOURS SCHEDULED
Qty: 60 CAPSULE | Refills: 0 | Status: SHIPPED | OUTPATIENT
Start: 2024-09-24

## 2024-09-24 RX ORDER — LEVOTHYROXINE SODIUM 75 UG/1
75 TABLET ORAL DAILY
Qty: 90 TABLET | Refills: 0 | Status: SHIPPED | OUTPATIENT
Start: 2024-09-24

## 2024-09-24 RX ORDER — QUETIAPINE FUMARATE 50 MG/1
50 TABLET, FILM COATED ORAL NIGHTLY
Qty: 90 TABLET | Refills: 0 | Status: SHIPPED | OUTPATIENT
Start: 2024-09-24

## 2024-09-24 RX ORDER — ATOMOXETINE 40 MG/1
40 CAPSULE ORAL DAILY
Qty: 90 CAPSULE | Refills: 0 | Status: SHIPPED | OUTPATIENT
Start: 2024-09-24

## 2024-10-14 ENCOUNTER — HOSPITAL ENCOUNTER (OUTPATIENT)
Dept: ULTRASOUND IMAGING | Facility: HOSPITAL | Age: 44
Discharge: HOME | End: 2024-10-14
Payer: MEDICAID

## 2024-10-14 DIAGNOSIS — R68.89 COLD SENSITIVITY: ICD-10-CM

## 2024-10-14 PROCEDURE — 93923 UPR/LXTR ART STDY 3+ LVLS: CPT

## 2025-01-18 DIAGNOSIS — R05.9 COUGH, UNSPECIFIED TYPE: ICD-10-CM

## 2025-01-18 DIAGNOSIS — E66.01 CLASS 3 SEVERE OBESITY DUE TO EXCESS CALORIES WITH SERIOUS COMORBIDITY AND BODY MASS INDEX (BMI) OF 50.0 TO 59.9 IN ADULT: ICD-10-CM

## 2025-01-18 DIAGNOSIS — F90.9 ATTENTION DEFICIT HYPERACTIVITY DISORDER (ADHD), UNSPECIFIED ADHD TYPE: ICD-10-CM

## 2025-01-18 DIAGNOSIS — E66.813 CLASS 3 SEVERE OBESITY DUE TO EXCESS CALORIES WITH SERIOUS COMORBIDITY AND BODY MASS INDEX (BMI) OF 50.0 TO 59.9 IN ADULT: ICD-10-CM

## 2025-01-18 DIAGNOSIS — F39 MOOD DISORDER: ICD-10-CM

## 2025-01-20 DIAGNOSIS — F39 MOOD DISORDER: ICD-10-CM

## 2025-01-20 DIAGNOSIS — E66.813 CLASS 3 SEVERE OBESITY DUE TO EXCESS CALORIES WITH SERIOUS COMORBIDITY AND BODY MASS INDEX (BMI) OF 50.0 TO 59.9 IN ADULT: ICD-10-CM

## 2025-01-20 DIAGNOSIS — E66.01 CLASS 3 SEVERE OBESITY DUE TO EXCESS CALORIES WITH SERIOUS COMORBIDITY AND BODY MASS INDEX (BMI) OF 50.0 TO 59.9 IN ADULT: ICD-10-CM

## 2025-01-20 RX ORDER — LEVOTHYROXINE SODIUM 75 UG/1
75 TABLET ORAL DAILY
Qty: 90 TABLET | Refills: 0 | Status: SHIPPED | OUTPATIENT
Start: 2025-01-20

## 2025-01-20 RX ORDER — FLUTICASONE PROPIONATE 50 MCG
2 SPRAY, SUSPENSION (ML) NASAL DAILY
Qty: 16 G | Refills: 2 | Status: SHIPPED | OUTPATIENT
Start: 2025-01-20

## 2025-01-20 RX ORDER — LEVOTHYROXINE SODIUM 75 UG/1
75 TABLET ORAL DAILY
Qty: 90 TABLET | Refills: 0 | OUTPATIENT
Start: 2025-01-20

## 2025-01-20 RX ORDER — BUPROPION HYDROCHLORIDE 200 MG/1
200 TABLET, EXTENDED RELEASE ORAL 2 TIMES DAILY
Qty: 180 TABLET | Refills: 0 | Status: SHIPPED | OUTPATIENT
Start: 2025-01-20

## 2025-01-20 RX ORDER — BUPROPION HYDROCHLORIDE 200 MG/1
200 TABLET, EXTENDED RELEASE ORAL 2 TIMES DAILY
Qty: 180 TABLET | Refills: 0 | OUTPATIENT
Start: 2025-01-20

## 2025-01-20 RX ORDER — OMEPRAZOLE 40 MG/1
40 CAPSULE, DELAYED RELEASE ORAL DAILY
Qty: 90 CAPSULE | Refills: 3 | Status: SHIPPED | OUTPATIENT
Start: 2025-01-20

## 2025-01-20 RX ORDER — ACETAMINOPHEN AND CODEINE PHOSPHATE 120; 12 MG/5ML; MG/5ML
1 SOLUTION ORAL DAILY
Qty: 28 TABLET | Refills: 12 | Status: SHIPPED | OUTPATIENT
Start: 2025-01-20 | End: 2026-01-20

## 2025-01-20 RX ORDER — ATOMOXETINE 40 MG/1
40 CAPSULE ORAL DAILY
Qty: 90 CAPSULE | Refills: 0 | OUTPATIENT
Start: 2025-01-20

## (undated) DEVICE — GLV SURG BIOGEL LTX PF 7 1/2

## (undated) DEVICE — GLV SURG SENSICARE W/ALOE PF LF 7.5 STRL

## (undated) DEVICE — GLV SURG GRN DERMASSURE LF PF 7.5

## (undated) DEVICE — ELECTRD NDL EZ CLN MOD 2.75IN

## (undated) DEVICE — APPL CHLORAPREP HI/LITE 26ML ORNG

## (undated) DEVICE — STRIP CLS WND CURAD MEDI/STRIP HYPOALLERG 0.5X4IN STRL PK/6

## (undated) DEVICE — PK SPINE CERV ANT 30

## (undated) DEVICE — CVR UNIV C/ARM

## (undated) DEVICE — GLV SURG BIOGEL LTX PF 6 1/2

## (undated) DEVICE — HALTR TRACT HD CERV STD UNIV

## (undated) DEVICE — DRP SURG UTIL W/TP 15X26IN STRL

## (undated) DEVICE — PCH INST SURG INVISISHIELD 2PCKT

## (undated) DEVICE — GLV SURG DERMASSURE GRN LF PF 8.0

## (undated) DEVICE — PIN DISTRACT TI 14MM STRL

## (undated) DEVICE — MANIFLD WAST MGMT SYS NEPTUNE2 4PT

## (undated) DEVICE — BUR NEURO ELITE PRECISION 3X3.8MM

## (undated) DEVICE — TP SILK DURAPORE 3IN

## (undated) DEVICE — SPNG DISSCT RND XRAY DETECT CHRRY STRL PK/5

## (undated) DEVICE — PENCL ES MEGADINE EZ/CLEAN BUTN W/HOLSTR 10FT

## (undated) DEVICE — PK SURG ECLIPSE UNIV W/O GOWN STRL

## (undated) DEVICE — KT OR TURNOVER BH PAD

## (undated) DEVICE — COLR CERV 3IN XL

## (undated) DEVICE — CLTH CLENS READYCLEANSE PERI CARE PK/5

## (undated) DEVICE — DRSNG TELFA PAD NONADH STR 1S 3X8IN

## (undated) DEVICE — TRAP FLD MINIVAC MEGADYNE 100ML